# Patient Record
Sex: MALE | Race: WHITE | NOT HISPANIC OR LATINO | Employment: FULL TIME | URBAN - METROPOLITAN AREA
[De-identification: names, ages, dates, MRNs, and addresses within clinical notes are randomized per-mention and may not be internally consistent; named-entity substitution may affect disease eponyms.]

---

## 2017-04-12 ENCOUNTER — APPOINTMENT (OUTPATIENT)
Dept: CT IMAGING | Facility: HOSPITAL | Age: 42
End: 2017-04-12

## 2017-04-12 ENCOUNTER — HOSPITAL ENCOUNTER (INPATIENT)
Facility: HOSPITAL | Age: 42
LOS: 2 days | Discharge: HOME | End: 2017-04-14
Attending: INTERNAL MEDICINE | Admitting: INTERNAL MEDICINE

## 2017-04-12 PROBLEM — J84.01 PULMONARY ALVEOLAR PROTEINOSIS (HCC): Status: ACTIVE | Noted: 2017-04-12

## 2017-04-12 LAB
ANION GAP SERPL CALCULATED.3IONS-SCNC: 10.9 MMOL/L
BASOPHILS # BLD AUTO: 0.03 10*3/MM3 (ref 0–0.2)
BASOPHILS NFR BLD AUTO: 0.2 % (ref 0–1.5)
BUN BLD-MCNC: 6 MG/DL (ref 6–20)
BUN/CREAT SERPL: 5.5 (ref 7–25)
CALCIUM SPEC-SCNC: 9 MG/DL (ref 8.6–10.5)
CHLORIDE SERPL-SCNC: 102 MMOL/L (ref 98–107)
CK SERPL-CCNC: 128 U/L (ref 20–200)
CO2 SERPL-SCNC: 28.1 MMOL/L (ref 22–29)
CREAT BLD-MCNC: 1.1 MG/DL (ref 0.76–1.27)
DEPRECATED RDW RBC AUTO: 43.6 FL (ref 37–54)
EOSINOPHIL # BLD AUTO: 0.12 10*3/MM3 (ref 0–0.7)
EOSINOPHIL NFR BLD AUTO: 0.6 % (ref 0.3–6.2)
ERYTHROCYTE [DISTWIDTH] IN BLOOD BY AUTOMATED COUNT: 13.7 % (ref 11.5–14.5)
GFR SERPL CREATININE-BSD FRML MDRD: 74 ML/MIN/1.73
GLUCOSE BLD-MCNC: 93 MG/DL (ref 65–99)
HCT VFR BLD AUTO: 38.3 % (ref 40.4–52.2)
HGB BLD-MCNC: 12.7 G/DL (ref 13.7–17.6)
IMM GRANULOCYTES # BLD: 0.05 10*3/MM3 (ref 0–0.03)
IMM GRANULOCYTES NFR BLD: 0.3 % (ref 0–0.5)
LYMPHOCYTES # BLD AUTO: 3.84 10*3/MM3 (ref 0.9–4.8)
LYMPHOCYTES NFR BLD AUTO: 19.6 % (ref 19.6–45.3)
MCH RBC QN AUTO: 28.9 PG (ref 27–32.7)
MCHC RBC AUTO-ENTMCNC: 33.2 G/DL (ref 32.6–36.4)
MCV RBC AUTO: 87 FL (ref 79.8–96.2)
MONOCYTES # BLD AUTO: 1.79 10*3/MM3 (ref 0.2–1.2)
MONOCYTES NFR BLD AUTO: 9.1 % (ref 5–12)
NEUTROPHILS # BLD AUTO: 13.79 10*3/MM3 (ref 1.9–8.1)
NEUTROPHILS NFR BLD AUTO: 70.2 % (ref 42.7–76)
NT-PROBNP SERPL-MCNC: 394.2 PG/ML (ref 5–450)
PLATELET # BLD AUTO: 298 10*3/MM3 (ref 140–500)
PMV BLD AUTO: 9.8 FL (ref 6–12)
POTASSIUM BLD-SCNC: 4.3 MMOL/L (ref 3.5–5.2)
PROCALCITONIN SERPL-MCNC: 0.16 NG/ML (ref 0.1–0.25)
RBC # BLD AUTO: 4.4 10*6/MM3 (ref 4.6–6)
SODIUM BLD-SCNC: 141 MMOL/L (ref 136–145)
TROPONIN T SERPL-MCNC: <0.01 NG/ML (ref 0–0.03)
WBC NRBC COR # BLD: 19.62 10*3/MM3 (ref 4.5–10.7)

## 2017-04-12 PROCEDURE — 25010000002 ENOXAPARIN PER 10 MG: Performed by: INTERNAL MEDICINE

## 2017-04-12 PROCEDURE — 84484 ASSAY OF TROPONIN QUANT: CPT | Performed by: INTERNAL MEDICINE

## 2017-04-12 PROCEDURE — G0378 HOSPITAL OBSERVATION PER HR: HCPCS

## 2017-04-12 PROCEDURE — 82550 ASSAY OF CK (CPK): CPT | Performed by: INTERNAL MEDICINE

## 2017-04-12 PROCEDURE — 25010000003 CEFTRIAXONE PER 250 MG: Performed by: INTERNAL MEDICINE

## 2017-04-12 PROCEDURE — 93010 ELECTROCARDIOGRAM REPORT: CPT | Performed by: INTERNAL MEDICINE

## 2017-04-12 PROCEDURE — 85025 COMPLETE CBC W/AUTO DIFF WBC: CPT | Performed by: INTERNAL MEDICINE

## 2017-04-12 PROCEDURE — 25010000002 HYDROMORPHONE PER 4 MG: Performed by: INTERNAL MEDICINE

## 2017-04-12 PROCEDURE — 80048 BASIC METABOLIC PNL TOTAL CA: CPT | Performed by: INTERNAL MEDICINE

## 2017-04-12 PROCEDURE — 84145 PROCALCITONIN (PCT): CPT | Performed by: INTERNAL MEDICINE

## 2017-04-12 PROCEDURE — 93005 ELECTROCARDIOGRAM TRACING: CPT | Performed by: INTERNAL MEDICINE

## 2017-04-12 PROCEDURE — 83880 ASSAY OF NATRIURETIC PEPTIDE: CPT | Performed by: INTERNAL MEDICINE

## 2017-04-12 PROCEDURE — 71260 CT THORAX DX C+: CPT

## 2017-04-12 PROCEDURE — 0 IOPAMIDOL 61 % SOLUTION: Performed by: INTERNAL MEDICINE

## 2017-04-12 RX ORDER — HYDROCODONE BITARTRATE AND ACETAMINOPHEN 5; 325 MG/1; MG/1
1 TABLET ORAL EVERY 4 HOURS PRN
Status: DISCONTINUED | OUTPATIENT
Start: 2017-04-12 | End: 2017-04-14 | Stop reason: HOSPADM

## 2017-04-12 RX ORDER — ONDANSETRON 4 MG/1
4 TABLET, FILM COATED ORAL EVERY 6 HOURS PRN
Status: DISCONTINUED | OUTPATIENT
Start: 2017-04-12 | End: 2017-04-14 | Stop reason: HOSPADM

## 2017-04-12 RX ORDER — SODIUM CHLORIDE 0.9 % (FLUSH) 0.9 %
1-10 SYRINGE (ML) INJECTION AS NEEDED
Status: DISCONTINUED | OUTPATIENT
Start: 2017-04-12 | End: 2017-04-14 | Stop reason: HOSPADM

## 2017-04-12 RX ORDER — ONDANSETRON 2 MG/ML
4 INJECTION INTRAMUSCULAR; INTRAVENOUS EVERY 6 HOURS PRN
Status: DISCONTINUED | OUTPATIENT
Start: 2017-04-12 | End: 2017-04-14 | Stop reason: HOSPADM

## 2017-04-12 RX ORDER — ONDANSETRON 4 MG/1
4 TABLET, ORALLY DISINTEGRATING ORAL EVERY 6 HOURS PRN
Status: DISCONTINUED | OUTPATIENT
Start: 2017-04-12 | End: 2017-04-14 | Stop reason: HOSPADM

## 2017-04-12 RX ORDER — BISACODYL 5 MG/1
5 TABLET, DELAYED RELEASE ORAL DAILY PRN
Status: DISCONTINUED | OUTPATIENT
Start: 2017-04-12 | End: 2017-04-14 | Stop reason: HOSPADM

## 2017-04-12 RX ORDER — HYDROMORPHONE HYDROCHLORIDE 1 MG/ML
0.5 INJECTION, SOLUTION INTRAMUSCULAR; INTRAVENOUS; SUBCUTANEOUS
Status: DISCONTINUED | OUTPATIENT
Start: 2017-04-12 | End: 2017-04-14 | Stop reason: HOSPADM

## 2017-04-12 RX ADMIN — ENOXAPARIN SODIUM 40 MG: 40 INJECTION SUBCUTANEOUS at 21:36

## 2017-04-12 RX ADMIN — HYDROMORPHONE HYDROCHLORIDE 0.5 MG: 1 INJECTION, SOLUTION INTRAMUSCULAR; INTRAVENOUS; SUBCUTANEOUS at 21:36

## 2017-04-12 RX ADMIN — HYDROCODONE BITARTRATE AND ACETAMINOPHEN 1 TABLET: 5; 325 TABLET ORAL at 16:40

## 2017-04-12 RX ADMIN — HYDROMORPHONE HYDROCHLORIDE 0.5 MG: 1 INJECTION, SOLUTION INTRAMUSCULAR; INTRAVENOUS; SUBCUTANEOUS at 17:20

## 2017-04-12 RX ADMIN — HYDROMORPHONE HYDROCHLORIDE 0.5 MG: 1 INJECTION, SOLUTION INTRAMUSCULAR; INTRAVENOUS; SUBCUTANEOUS at 19:35

## 2017-04-12 RX ADMIN — CEFTRIAXONE SODIUM 1 G: 1 INJECTION, SOLUTION INTRAVENOUS at 18:26

## 2017-04-12 RX ADMIN — IOPAMIDOL 75 ML: 612 INJECTION, SOLUTION INTRAVENOUS at 18:45

## 2017-04-12 NOTE — H&P
History and Physical    Patient Name: Arturo Canales  Age/Sex: 41 y.o. male  : 1975  MRN: [id number]    Date of Admission: 2017  Date of Encounter Visit: 17  Encounter Provider: Chriss Mason MD  Referring Provider: Chriss Mason MD  Place of Service: [facility]   Patient Care Team:  Kiah Gonzales MD as PCP - General (Internal Medicine)      Subjective:     Chief Complaint: Chest pain of the pleuritic type    History of Present Illness:  Arturo Canales is a 41 y.o. 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  Patient had complications that time with severe pleurisy from pneumonia he did grow some Streptococcus organism and he developed diaphragmatic paralysis and was unable to handle his secretion and was intubated and on the ventilator for several days.  Eventually he gradually improved and was discharged home with follow-up chest x-ray and sniff test showing significant improvement and normalization of the left hemidiaphragmatic function.  His lung biopsy and BAL and all the studies were consistent with pulmonary RV over proteinosis and he did not require any alveolar lavage at the time.  Patient continue to follow with the main regional expert in this disease which is Dr. Mariee in [place], he saw far had 2 alveolar lavage is done for the pulmonary alveolar proteinosis treatment the last one was done yesterday.  After the first lavage patient was more dyspneic and it took him a while to recover this time he did actually much better and he was feeling almost better than his baseline within few hours after the procedure and was done as an outpatient.  Today he's been having some pleurisy on the left side and there is no productive cough or hemoptysis or fever or chills.  Patient went to the emergency room at [facility] and I was called by the patient per his request to arrange for the transfer for further  evaluation and treatment.  He did have a chest x-ray over there that based on the conversation with the ER physician at the other hospital, it showed a wedge infiltrate and there was some concern about pulmonary embolism given the pleurisy.  Patient had not had any blood test done and he is oxygenating well on room air is still.  The plan was to transfer to [facility] for 23 hour observation and to evaluated with a CT of the chest PE protocol and blood workup and antibiotic with further decision-making depending on the results.      Pulmonary Functions Testing Results:    Review of Systems:   Review of Systems  Pleuritic chest pain with dyspnea and exertion, no nausea or vomiting or diarrhea or abdominal pain or discomfort  No dysuria or hematuria problem with urinary frequency or urgency  No lower extremity edema or swelling or joint swelling or effusion  No confusion or altered mental status,  No chills, no productive cough or hemoptysis.  Rest of the 12 point system review was negative  Past Medical History:  Past Medical History:   Diagnosis Date   • Pneumonia        Past Surgical History:   Procedure Laterality Date   • BACK SURGERY      TWICE   • BRONCHOSCOPY N/A 10/27/2016    Procedure: BRONCHOSCOPY WITH FLUORO & CRYO BIOPSY;  Surgeon: Italo Escalera MD;  Location: [facility] ENDOSCOPY;  Service:    • BRONCHOSCOPY N/A 10/31/2016    Procedure: BRONCHOSCOPY BIOPSY AT BEDSIDE;  Surgeon: Chriss Mason MD;  Location: [facility] ENDOSCOPY;  Service:    • BRONCHOSCOPY N/A 11/4/2016    Procedure: BRONCHOSCOPY  WITH WASHINGS;  Surgeon: Chriss Mason MD;  Location: [facility] ENDOSCOPY;  Service:    • KNEE ARTHROSCOPY Bilateral    • WRIST FRACTURE SURGERY Left        Home Medications:   Prescriptions Prior to Admission   Medication Sig Dispense Refill Last Dose   • predniSONE (DELTASONE) 20 MG tablet One pill bid for 1 day, then  1 daily for 2 days, then  1/2 daily for 4 days, then stop 6 tablet 0    • temazepam  (RESTORIL) 15 MG capsule Take 1 capsule by mouth At Night As Needed for sleep. 20 capsule 0    • traZODone (DESYREL) 50 MG tablet Take 50 mg by mouth Every Night.   10/27/2016 at Unknown time   • vilazodone (VIIBRYD) 40 MG tablet tablet Take 40 mg by mouth Daily.   10/27/2016 at Unknown time       Inpatient Medications:  Scheduled Meds:  enoxaparin 40 mg Subcutaneous Q12H     Continuous Infusions:   PRN Meds:.•  bisacodyl  •  HYDROcodone-acetaminophen  •  ondansetron **OR** ondansetron ODT **OR** ondansetron  •  sodium chloride    Allergies:  No Known Allergies    Past Social History:  Social History     Social History   • Marital status:      Spouse name: N/A   • Number of children: N/A   • Years of education: N/A     Social History Main Topics   • Smoking status: Never Smoker   • Smokeless tobacco: Not on file   • Alcohol use 0.6 oz/week     1 Glasses of wine per week      Comment: once or twice a week   • Drug use: No   • Sexual activity: Not on file     Other Topics Concern   • Not on file     Social History Narrative       Past Family History:  Family History   Problem Relation Age of Onset   • Lymphoma Father            Objective:   Temp:  [98 °F (36.7 °C)] 98 °F (36.7 °C)  Heart Rate:  [53] 53  Resp:  [18] 18  BP: (99)/(58) 99/58   SpO2:  [98 %] 98 %  on    O2 Device: room air  No intake or output data in the 24 hours ending 04/12/17 1512  There is no height or weight on file to calculate BMI.  There were no vitals filed for this visit.  Weight change:     Physical Exam:   Physical Exam   General:    No acute distress, alert and oriented x4, pleasant on room air                    Head:    Normocephalic, atraumatic.   Eyes:          Conjunctivae and sclerae normal, no icterus, PERRLA   Throat:   No oral lesions, no thrush, oral mucosa moist.    Neck:   Supple, trachea midline.   Lungs:     Normal chest on inspection, CTAB, no wheezes. No rhonchi.  Minimal inspiratory crackles. Respirations regular,  even and unlabored.     Heart:    Regular rhythm and normal rate.  No murmurs, gallops, or rubs noted.   Abdomen:     Soft, non-tender, non-distended, positive bowel sounds.    Extremities:   No clubbing, cyanosis, or edema.     Pulses:   Pulses palpable and equal bilaterally.    Skin:   No bleeding or rash.   Neuro:   Non-focal.  Moves all extremities well.    Psychiatric:   Normal mood and affect.     Lab Review:                                                                   CXR 12/16/2016    Imaging:  Imaging Results (most recent)     None          I personally viewed and interpreted the patient's imaging studies.    Assessment:     Chest pain  PAP  MARTINEZ      Plan:   Patient had a recent procedure with general anesthesia for 3 hours and this is always a risk for venous thromboembolism.  He is complaining of chest pain and his chest x-ray showed a wedge shaped infiltrate and he has valid reasons to have similar finding given his underlying disease however pulmonary embolism still needs to be ruled out.  Patient has similar presentation several months ago when he was first diagnosed with a pulmonary alveolar proteinosis and he had a pleurisy and pneumonia at the time was significant pleurisy requiring IV PCA Dilaudid and despite that he had significant splinting and  paralysis of that left hemidiaphragm which did improve on follow-up.  Patient just had lavage of his right lung for his PAP on April 11, 2017 but he did not have any immediate postoperative problem other than this morning when he started having the chest pain and his recovery was even faster than his prior lung lavage that he had at the [facility] for pulmonary alveolar proteinosis with Dr. Mariee/claudia.  We will proceed with a CT PE protocol will check stat labs including pro-calcitonin, we'll cover for pneumonia which sometimes can be a complication from bronchoscopy and transient bacteremia and will consider further recommendation and  will consult with his PAP specialist after the CT scan results are available for review  Patient would be 23 hour observation and will reassess in the morning and decide if full admission need to be considered  ADDENDUM:  I did review the case with dr Raza after the Ct films were available and she could not remember his previous scan and she will get back to me in the morning after she review them. His WBC was elevated but his PCT was 0.15,  updated the family and will reassess in am, he was started on rocephin for now.          Chriss Mason MD  [facility]   04/12/17  3:12 PM    EMR Dragon/Transcription disclaimer:   Much of this encounter note is an electronic transcription/translation of spoken language to printed text. The electronic translation of spoken language may permit erroneous, or at times, nonsensical words or phrases to be inadvertently transcribed; Although I have reviewed the note for such errors, some may still exist.

## 2017-04-12 NOTE — PLAN OF CARE
Problem: Patient Care Overview (Adult)  Goal: Plan of Care Review  Outcome: Ongoing (interventions implemented as appropriate)    04/12/17 1600 04/12/17 9569   Outcome Evaluation   Outcome Summary/Follow up Plan --  CT Chest pending. VSS. Pain treated with dilaudid. IV rocephine. Continue to monitor.   Coping/Psychosocial Response Interventions   Plan Of Care Reviewed With patient;spouse --        Goal: Adult Individualization and Mutuality  Outcome: Ongoing (interventions implemented as appropriate)  Goal: Discharge Needs Assessment  Outcome: Ongoing (interventions implemented as appropriate)    Problem: Pain, Acute (Adult)  Goal: Identify Related Risk Factors and Signs and Symptoms  Outcome: Outcome(s) achieved Date Met:  04/12/17  Goal: Acceptable Pain Control/Comfort Level  Outcome: Ongoing (interventions implemented as appropriate)

## 2017-04-13 PROBLEM — J18.9 PNA (PNEUMONIA): Status: ACTIVE | Noted: 2017-04-13

## 2017-04-13 LAB
DEPRECATED RDW RBC AUTO: 44.3 FL (ref 37–54)
ERYTHROCYTE [DISTWIDTH] IN BLOOD BY AUTOMATED COUNT: 13.7 % (ref 11.5–14.5)
HCT VFR BLD AUTO: 38.4 % (ref 40.4–52.2)
HGB BLD-MCNC: 12.7 G/DL (ref 13.7–17.6)
IGA1 MFR SER: 143 MG/DL (ref 70–400)
IGG1 SER-MCNC: 703 MG/DL (ref 700–1600)
IGM SERPL-MCNC: 110 MG/DL (ref 40–230)
MCH RBC QN AUTO: 29.1 PG (ref 27–32.7)
MCHC RBC AUTO-ENTMCNC: 33.1 G/DL (ref 32.6–36.4)
MCV RBC AUTO: 88.1 FL (ref 79.8–96.2)
PLATELET # BLD AUTO: 305 10*3/MM3 (ref 140–500)
PMV BLD AUTO: 9.9 FL (ref 6–12)
RBC # BLD AUTO: 4.36 10*6/MM3 (ref 4.6–6)
S PYO AG THROAT QL: NEGATIVE
WBC NRBC COR # BLD: 13.67 10*3/MM3 (ref 4.5–10.7)

## 2017-04-13 PROCEDURE — 25010000002 HYDROMORPHONE PER 4 MG: Performed by: INTERNAL MEDICINE

## 2017-04-13 PROCEDURE — 99255 IP/OBS CONSLTJ NEW/EST HI 80: CPT | Performed by: INTERNAL MEDICINE

## 2017-04-13 PROCEDURE — 25010000003 CEFTRIAXONE PER 250 MG: Performed by: INTERNAL MEDICINE

## 2017-04-13 PROCEDURE — 85027 COMPLETE CBC AUTOMATED: CPT | Performed by: INTERNAL MEDICINE

## 2017-04-13 PROCEDURE — 87205 SMEAR GRAM STAIN: CPT | Performed by: INTERNAL MEDICINE

## 2017-04-13 PROCEDURE — 25010000002 ENOXAPARIN PER 10 MG: Performed by: INTERNAL MEDICINE

## 2017-04-13 PROCEDURE — 87070 CULTURE OTHR SPECIMN AEROBIC: CPT | Performed by: INTERNAL MEDICINE

## 2017-04-13 PROCEDURE — 82787 IGG 1 2 3 OR 4 EACH: CPT | Performed by: INTERNAL MEDICINE

## 2017-04-13 PROCEDURE — 87880 STREP A ASSAY W/OPTIC: CPT | Performed by: INTERNAL MEDICINE

## 2017-04-13 RX ORDER — TRAZODONE HYDROCHLORIDE 50 MG/1
150 TABLET ORAL NIGHTLY
COMMUNITY
Start: 2017-01-24 | End: 2017-04-14 | Stop reason: HOSPADM

## 2017-04-13 RX ORDER — TRAZODONE HYDROCHLORIDE 150 MG/1
150 TABLET ORAL NIGHTLY
Status: DISCONTINUED | OUTPATIENT
Start: 2017-04-13 | End: 2017-04-14 | Stop reason: HOSPADM

## 2017-04-13 RX ORDER — VILAZODONE HYDROCHLORIDE 40 MG/1
40 TABLET ORAL DAILY
Status: DISCONTINUED | OUTPATIENT
Start: 2017-04-13 | End: 2017-04-14 | Stop reason: HOSPADM

## 2017-04-13 RX ORDER — VILAZODONE HYDROCHLORIDE 40 MG/1
40 TABLET ORAL NIGHTLY
COMMUNITY
Start: 2017-01-12 | End: 2017-04-14 | Stop reason: HOSPADM

## 2017-04-13 RX ORDER — CEFTRIAXONE SODIUM 2 G/50ML
2 INJECTION, SOLUTION INTRAVENOUS EVERY 24 HOURS
Status: COMPLETED | OUTPATIENT
Start: 2017-04-13 | End: 2017-04-14

## 2017-04-13 RX ADMIN — HYDROMORPHONE HYDROCHLORIDE 0.5 MG: 1 INJECTION, SOLUTION INTRAMUSCULAR; INTRAVENOUS; SUBCUTANEOUS at 19:16

## 2017-04-13 RX ADMIN — HYDROMORPHONE HYDROCHLORIDE 0.5 MG: 1 INJECTION, SOLUTION INTRAMUSCULAR; INTRAVENOUS; SUBCUTANEOUS at 21:27

## 2017-04-13 RX ADMIN — HYDROCODONE BITARTRATE AND ACETAMINOPHEN 1 TABLET: 5; 325 TABLET ORAL at 14:20

## 2017-04-13 RX ADMIN — ENOXAPARIN SODIUM 40 MG: 40 INJECTION SUBCUTANEOUS at 21:01

## 2017-04-13 RX ADMIN — HYDROCODONE BITARTRATE AND ACETAMINOPHEN 1 TABLET: 5; 325 TABLET ORAL at 22:50

## 2017-04-13 RX ADMIN — TRAZODONE HYDROCHLORIDE 150 MG: 150 TABLET, FILM COATED ORAL at 21:01

## 2017-04-13 RX ADMIN — ENOXAPARIN SODIUM 40 MG: 40 INJECTION SUBCUTANEOUS at 09:52

## 2017-04-13 RX ADMIN — VILAZODONE HYDROCHLORIDE 40 MG: 40 TABLET ORAL at 21:01

## 2017-04-13 RX ADMIN — HYDROCODONE BITARTRATE AND ACETAMINOPHEN 1 TABLET: 5; 325 TABLET ORAL at 09:52

## 2017-04-13 RX ADMIN — HYDROMORPHONE HYDROCHLORIDE 0.5 MG: 1 INJECTION, SOLUTION INTRAMUSCULAR; INTRAVENOUS; SUBCUTANEOUS at 16:20

## 2017-04-13 RX ADMIN — CEFTRIAXONE SODIUM 2 G: 2 INJECTION, SOLUTION INTRAVENOUS at 12:20

## 2017-04-13 RX ADMIN — HYDROMORPHONE HYDROCHLORIDE 0.5 MG: 1 INJECTION, SOLUTION INTRAMUSCULAR; INTRAVENOUS; SUBCUTANEOUS at 11:46

## 2017-04-13 NOTE — PAYOR COMM NOTE
"Faizan West (41 y.o. Male)     PLEASE SEE ATTACHED CLINICAL REVIEW ON PATIENT. PT. WAS ADMITTED TO [facility] ON 17--      REF#[id number]    PLEASE CALL   OR FAAX  WITH INPT AUTH AND DAYS APPROVED.     THANK YOU    ELMER BOWLING LPN, CCP        Date of Birth Social Security Number Address Home Phone MRN    1975  [id number] [phone] [id number]    Syn[employer] Marital Status          Yarsanism        Admission Date Admission Type Admitting Provider Attending Provider Department, Room/Bed    17 Elective Chriss Mason MD Saad, Lebnan S, MD [address], 3/1    Discharge Date Discharge Disposition Discharge Destination                      Attending Provider: Chriss Mason MD     Allergies:  No Known Allergies    Isolation:  None   Infection:  None   Code Status:  FULL    Ht:  71\" (180.3 cm)   Wt:  175 lb 4.8 oz (79.5 kg)    Admission Cmt:  None   Principal Problem:  None                Active Insurance as of 2017     Primary Coverage     Payor Plan Insurance Group Employer/Plan Group    [facility] iogyn [facility] [id number]     Payor Plan Address Payor Plan Phone Number Effective From Effective To    [address] [address] [phone] 2016     [place] [address]       Subscriber Name Subscriber Birth Date Member ID       FAIZAN WEST 1975 [id number]                 Emergency Contacts      (Rel.) Home Phone Work Phone Mobile Phone    Serenity West (Spouse) [phone] -- [phone]               History & Physical      Chriss Mason MD at 2017  3:12 PM            History and Physical    Patient Name: Faizan West  Age/Sex: 41 y.o. male  : 1975  MRN: [id number]    Date of Admission: 2017  Date of Encounter Visit: 17  Encounter Provider: Chriss Mason MD  Referring Provider: Chriss Mason MD  Place of Service: [facility]   Patient Care Team:  Kiah" ELISE Gonzales MD as PCP - General (Internal Medicine)      Subjective:     Chief Complaint: Chest pain of the pleuritic type    History of Present Illness:  Arturo Canales is a 41 y.o. 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  Patient had complications that time with severe pleurisy from pneumonia he did grow some Streptococcus organism and he developed diaphragmatic paralysis and was unable to handle his secretion and was intubated and on the ventilator for several days.  Eventually he gradually improved and was discharged home with follow-up chest x-ray and sniff test showing significant improvement and normalization of the left hemidiaphragmatic function.  His lung biopsy and BAL and all the studies were consistent with pulmonary RV over proteinosis and he did not require any alveolar lavage at the time.  Patient continue to follow with the main regional expert in this disease which is Dr. Mariee in [place], he saw far had 2 alveolar lavage is done for the pulmonary alveolar proteinosis treatment the last one was done yesterday.  After the first lavage patient was more dyspneic and it took him a while to recover this time he did actually much better and he was feeling almost better than his baseline within few hours after the procedure and was done as an outpatient.  Today he's been having some pleurisy on the left side and there is no productive cough or hemoptysis or fever or chills.  Patient went to the emergency room at [facility] and I was called by the patient per his request to arrange for the transfer for further evaluation and treatment.  He did have a chest x-ray over there that based on the conversation with the ER physician at the other hospital, it showed a wedge infiltrate and there was some concern about pulmonary embolism given the pleurisy.  Patient had not had any blood test done and he is oxygenating well on room air  is still.  The plan was to transfer to [facility] for 23 hour observation and to evaluated with a CT of the chest PE protocol and blood workup and antibiotic with further decision-making depending on the results.      Pulmonary Functions Testing Results:    Review of Systems:   Review of Systems  Pleuritic chest pain with dyspnea and exertion, no nausea or vomiting or diarrhea or abdominal pain or discomfort  No dysuria or hematuria problem with urinary frequency or urgency  No lower extremity edema or swelling or joint swelling or effusion  No confusion or altered mental status,  No chills, no productive cough or hemoptysis.  Rest of the 12 point system review was negative  Past Medical History:  Past Medical History:   Diagnosis Date   • Pneumonia        Past Surgical History:   Procedure Laterality Date   • BACK SURGERY      TWICE   • BRONCHOSCOPY N/A 10/27/2016    Procedure: BRONCHOSCOPY WITH FLUORO & CRYO BIOPSY;  Surgeon: Italo Escalera MD;  Location: [facility] ENDOSCOPY;  Service:    • BRONCHOSCOPY N/A 10/31/2016    Procedure: BRONCHOSCOPY BIOPSY AT BEDSIDE;  Surgeon: Chriss Mason MD;  Location: [facility] ENDOSCOPY;  Service:    • BRONCHOSCOPY N/A 11/4/2016    Procedure: BRONCHOSCOPY  WITH WASHINGS;  Surgeon: Chriss Mason MD;  Location: [facility] ENDOSCOPY;  Service:    • KNEE ARTHROSCOPY Bilateral    • WRIST FRACTURE SURGERY Left        Home Medications:   Prescriptions Prior to Admission   Medication Sig Dispense Refill Last Dose   • predniSONE (DELTASONE) 20 MG tablet One pill bid for 1 day, then  1 daily for 2 days, then  1/2 daily for 4 days, then stop 6 tablet 0    • temazepam (RESTORIL) 15 MG capsule Take 1 capsule by mouth At Night As Needed for sleep. 20 capsule 0    • traZODone (DESYREL) 50 MG tablet Take 50 mg by mouth Every Night.   10/27/2016 at Unknown time   • vilazodone (VIIBRYD) 40 MG tablet tablet Take 40 mg by mouth Daily.   10/27/2016 at Unknown time       Inpatient  Medications:  Scheduled Meds:  enoxaparin 40 mg Subcutaneous Q12H     Continuous Infusions:   PRN Meds:.•  bisacodyl  •  HYDROcodone-acetaminophen  •  ondansetron **OR** ondansetron ODT **OR** ondansetron  •  sodium chloride    Allergies:  No Known Allergies    Past Social History:  Social History     Social History   • Marital status:      Spouse name: N/A   • Number of children: N/A   • Years of education: N/A     Social History Main Topics   • Smoking status: Never Smoker   • Smokeless tobacco: Not on file   • Alcohol use 0.6 oz/week     1 Glasses of wine per week      Comment: once or twice a week   • Drug use: No   • Sexual activity: Not on file     Other Topics Concern   • Not on file     Social History Narrative       Past Family History:  Family History   Problem Relation Age of Onset   • Lymphoma Father            Objective:   Temp:  [98 °F (36.7 °C)] 98 °F (36.7 °C)  Heart Rate:  [53] 53  Resp:  [18] 18  BP: (99)/(58) 99/58   SpO2:  [98 %] 98 %  on    O2 Device: room air  No intake or output data in the 24 hours ending 04/12/17 1512  There is no height or weight on file to calculate BMI.  There were no vitals filed for this visit.  Weight change:     Physical Exam:   Physical Exam   General:    No acute distress, alert and oriented x4, pleasant on room air                    Head:    Normocephalic, atraumatic.   Eyes:          Conjunctivae and sclerae normal, no icterus, PERRLA   Throat:   No oral lesions, no thrush, oral mucosa moist.    Neck:   Supple, trachea midline.   Lungs:     Normal chest on inspection, CTAB, no wheezes. No rhonchi.  Minimal inspiratory crackles. Respirations regular, even and unlabored.     Heart:    Regular rhythm and normal rate.  No murmurs, gallops, or rubs noted.   Abdomen:     Soft, non-tender, non-distended, positive bowel sounds.    Extremities:   No clubbing, cyanosis, or edema.     Pulses:   Pulses palpable and equal bilaterally.    Skin:   No bleeding or rash.    Neuro:   Non-focal.  Moves all extremities well.    Psychiatric:   Normal mood and affect.     Lab Review:                                                                   CXR 12/16/2016    Imaging:  Imaging Results (most recent)     None          I personally viewed and interpreted the patient's imaging studies.    Assessment:     Chest pain  PAP  MARTINEZ      Plan:   Patient had a recent procedure with general anesthesia for 3 hours and this is always a risk for venous thromboembolism.  He is complaining of chest pain and his chest x-ray showed a wedge shaped infiltrate and he has valid reasons to have similar finding given his underlying disease however pulmonary embolism still needs to be ruled out.  Patient has similar presentation several months ago when he was first diagnosed with a pulmonary alveolar proteinosis and he had a pleurisy and pneumonia at the time was significant pleurisy requiring IV PCA Dilaudid and despite that he had significant splinting and  paralysis of that left hemidiaphragm which did improve on follow-up.  Patient just had lavage of his right lung for his PAP on April 11, 2017 but he did not have any immediate postoperative problem other than this morning when he started having the chest pain and his recovery was even faster than his prior lung lavage that he had at the [facility] for pulmonary alveolar proteinosis with Dr. Mariee/claudia.  We will proceed with a CT PE protocol will check stat labs including pro-calcitonin, we'll cover for pneumonia which sometimes can be a complication from bronchoscopy and transient bacteremia and will consider further recommendation and will consult with his PAP specialist after the CT scan results are available for review  Patient would be 23 hour observation and will reassess in the morning and decide if full admission need to be considered  ADDENDUM:  I did review the case with dr Raza after the Ct films were available and she could not  remember his previous scan and she will get back to me in the morning after she review them. His WBC was elevated but his PCT was 0.15,  updated the family and will reassess in am, he was started on rocephin for now.          Chriss Mason MD  [facility]   04/12/17  3:12 PM    EMR Dragon/Transcription disclaimer:   Much of this encounter note is an electronic transcription/translation of spoken language to printed text. The electronic translation of spoken language may permit erroneous, or at times, nonsensical words or phrases to be inadvertently transcribed; Although I have reviewed the note for such errors, some may still exist.            Electronically signed by Chriss Mason MD at 4/12/2017  7:10 PM        Emergency Department Notes     No notes of this type exist for this encounter.        Vital Signs (last 24 hours)       04/12 0700  -  04/13 0659 04/13 0700  -  04/13 1422   Most Recent    Temp (°F) 97.1 -  98    97.9 -  98.1     97.9 (36.6)    Heart Rate 51 -  75    57 -  71     71    Resp   18    18 -  20     18    BP 91/56 -  103/64    106/69 -  108/66     106/69    SpO2 (%) 98 -  99    95 -  96     95          Intake & Output (last day)       04/12 0701 - 04/13 0700 04/13 0701 - 04/14 0700    IV Piggyback  50    Total Intake(mL/kg)  50 (0.6)    Net   +50              Lines, Drains & Airways    Active LDAs     Name:   Placement date:   Placement time:   Site:   Days:    Peripheral IV Line - Single Lumen 04/12/17 1618  20 gauge  04/12/17    1618      less than 1         Inactive LDAs     None                Hospital Medications (all)       Dose Frequency Start End    bisacodyl (DULCOLAX) EC tablet 5 mg 5 mg Daily PRN 4/12/2017     Sig - Route: Take 1 tablet by mouth Daily As Needed for Constipation. - Oral    cefTRIAXone (ROCEPHIN) IVPB 2 g 2 g Every 24 Hours 4/13/2017     Sig - Route: Infuse 50 mL into a venous catheter Daily. - Intravenous    enoxaparin (LOVENOX) syringe 40 mg 40 mg  "Every 12 Hours Scheduled 4/12/2017     Sig - Route: Inject 0.4 mL under the skin Every 12 (Twelve) Hours. - Subcutaneous    HYDROcodone-acetaminophen (NORCO) 5-325 MG per tablet 1 tablet 1 tablet Every 4 Hours PRN 4/12/2017 4/22/2017    Sig - Route: Take 1 tablet by mouth Every 4 (Four) Hours As Needed for Moderate Pain (4-6). - Oral    HYDROmorphone (DILAUDID) injection 0.5 mg 0.5 mg Every 2 Hours PRN 4/12/2017 4/22/2017    Sig - Route: Infuse 0.5 mg into a venous catheter Every 2 (Two) Hours As Needed for Severe Pain (7-10). - Intravenous    iopamidol (ISOVUE-300) 61 % injection 100 mL 100 mL Once in Imaging 4/12/2017 4/12/2017    Sig - Route: Infuse 100 mL into a venous catheter Once. - Intravenous    ondansetron (ZOFRAN) injection 4 mg 4 mg Every 6 Hours PRN 4/12/2017     Sig - Route: Infuse 2 mL into a venous catheter Every 6 (Six) Hours As Needed for Nausea or Vomiting. - Intravenous    Linked Group 1:  \"Or\" Linked Group Details        ondansetron (ZOFRAN) tablet 4 mg 4 mg Every 6 Hours PRN 4/12/2017     Sig - Route: Take 1 tablet by mouth Every 6 (Six) Hours As Needed for Nausea or Vomiting. - Oral    Linked Group 1:  \"Or\" Linked Group Details        ondansetron ODT (ZOFRAN-ODT) disintegrating tablet 4 mg 4 mg Every 6 Hours PRN 4/12/2017     Sig - Route: Take 1 tablet by mouth Every 6 (Six) Hours As Needed for Nausea or Vomiting. - Oral    Linked Group 1:  \"Or\" Linked Group Details        pneumococcal conj. 13-valent (PREVNAR-13) vaccine 0.5 mL 0.5 mL Once 4/13/2017     Sig - Route: Inject 0.5 mL into the shoulder, thigh, or buttocks 1 (One) Time. - Intramuscular    sodium chloride 0.9 % flush 1-10 mL 1-10 mL As Needed 4/12/2017     Sig - Route: Infuse 1-10 mL into a venous catheter As Needed for Line Care. - Intravenous    traZODone (DESYREL) tablet 150 mg 150 mg Nightly 4/13/2017     Sig - Route: Take 1 tablet by mouth Every Night. - Oral    vilazodone (VIIBRYD) tablet 40 mg 40 mg Daily 4/13/2017     Sig - " Route: Take 1 tablet by mouth Daily. - Oral    cefTRIAXone (ROCEPHIN) IVPB 1 g (Discontinued) 1 g Every 24 Hours 4/12/2017 4/13/2017    Sig - Route: Infuse 50 mL into a venous catheter Daily. - Intravenous            Lab Results (last 24 hours)     Procedure Component Value Units Date/Time    CBC Auto Differential [[id number]]  (Abnormal) Collected:  04/12/17 1606    Specimen:  Blood Updated:  04/12/17 1705     WBC 19.62 (H) 10*3/mm3      RBC 4.40 (L) 10*6/mm3      Hemoglobin 12.7 (L) g/dL      Hematocrit 38.3 (L) %      MCV 87.0 fL      MCH 28.9 pg      MCHC 33.2 g/dL      RDW 13.7 %      RDW-SD 43.6 fl      MPV 9.8 fL      Platelets 298 10*3/mm3      Neutrophil % 70.2 %      Lymphocyte % 19.6 %      Monocyte % 9.1 %      Eosinophil % 0.6 %      Basophil % 0.2 %      Immature Grans % 0.3 %      Neutrophils, Absolute 13.79 (H) 10*3/mm3      Lymphocytes, Absolute 3.84 10*3/mm3      Monocytes, Absolute 1.79 (H) 10*3/mm3      Eosinophils, Absolute 0.12 10*3/mm3      Basophils, Absolute 0.03 10*3/mm3      Immature Grans, Absolute 0.05 (H) 10*3/mm3     BNP [[id number]]  (Normal) Collected:  04/12/17 1606    Specimen:  Blood Updated:  04/12/17 1711     proBNP 394.2 pg/mL     Narrative:       Among patients with dyspnea, NT-proBNP is highly sensitive for the detection of acute congestive heart failure. In addition NT-proBNP of <300 pg/ml effectively rules out acute congestive heart failure with 99% negative predictive value.    Troponin [[id number]]  (Normal) Collected:  04/12/17 1606    Specimen:  Blood Updated:  04/12/17 1711     Troponin T <0.010 ng/mL     Narrative:       Troponin T Reference Ranges:  Less than 0.03 ng/mL:    Negative for AMI  0.03 to 0.09 ng/mL:      Indeterminant for AMI  Greater than 0.09 ng/mL: Positive for AMI    Basic Metabolic Panel [[id number]]  (Abnormal) Collected:  04/12/17 1606    Specimen:  Blood Updated:  04/12/17 1712     Glucose 93 mg/dL      BUN 6 mg/dL      Creatinine 1.10 mg/dL      Sodium  "141 mmol/L      Potassium 4.3 mmol/L      Chloride 102 mmol/L      CO2 28.1 mmol/L      Calcium 9.0 mg/dL      eGFR Non African Amer 74 mL/min/1.73      BUN/Creatinine Ratio 5.5 (L)     Anion Gap 10.9 mmol/L     Narrative:       GFR Normal >60  Chronic Kidney Disease <60  Kidney Failure <15    CK [[id number]]  (Normal) Collected:  04/12/17 1606    Specimen:  Blood Updated:  04/12/17 1712     Creatine Kinase 128 U/L     Procalcitonin [[id number]]  (Normal) Collected:  04/12/17 1606    Specimen:  Blood Updated:  04/12/17 1720     Procalcitonin 0.16 ng/mL     Narrative:       As a Marker for Sepsis (Non-Neonates):   1. <0.5 ng/mL represents a low risk of severe sepsis and/or septic shock.  1. >2 ng/mL represents a high risk of severe sepsis and/or septic shock.    As a Marker for Lower Respiratory Tract Infections that require antibiotic therapy:  PCT on Admission     Antibiotic Therapy             6-12 Hrs later  > 0.5                Strongly Recommended            >0.25 - <0.5         Recommended  0.1 - 0.25           Discouraged                   Remeasure/reassess PCT  <0.1                 Strongly Discouraged          Remeasure/reassess PCT      As 28 day mortality risk marker: \"Change in Procalcitonin Result\" (> 80 % or <=80 %) if Day 0 (or Day 1) and Day 4 values are available. Refer to http://www.[employer]-pct-calculator.com/   Change in PCT <=80 %   A decrease of PCT levels below or equal to 80 % defines a positive change in PCT test result representing a higher risk for 28-day all-cause mortality of patients diagnosed with severe sepsis or septic shock.  Change in PCT > 80 %   A decrease of PCT levels of more than 80 % defines a negative change in PCT result representing a lower risk for 28-day all-cause mortality of patients diagnosed with severe sepsis or septic shock.                CBC (No Diff) [[id number]]  (Abnormal) Collected:  04/13/17 0519    Specimen:  Blood Updated:  04/13/17 0608     WBC 13.67 (H) " 10*3/mm3      RBC 4.36 (L) 10*6/mm3      Hemoglobin 12.7 (L) g/dL      Hematocrit 38.4 (L) %      MCV 88.1 fL      MCH 29.1 pg      MCHC 33.1 g/dL      RDW 13.7 %      RDW-SD 44.3 fl      MPV 9.9 fL      Platelets 305 10*3/mm3     IgG, IgA, IgM [[id number]] Collected:  04/13/17 1111    Specimen:  Blood Updated:  04/13/17 1117    IgG subclasses (1-4) [[id number]] Collected:  04/13/17 1111    Specimen:  Blood Updated:  04/13/17 1122        Imaging Results (last 24 hours)     Procedure Component Value Units Date/Time    CT Chest With Contrast [[id number]] Collected:  04/12/17 1842     Updated:  04/12/17 1856    Narrative:       CT CHEST W CONTRAST-     INDICATIONS: Pulmonary infiltrate     TECHNIQUE:     ENHANCED CHEST CT     COMPARISON: 11/06/2016     FINDINGS:              The heart size is normal without pericardial effusion.     A pretracheal lymph node measuring 7 mm in short axis on image 41 of  series 2, previously 9 mm in short axis. A right hilar lymph node  measuring 1.6 cm in short axis on image 52 of series 2, less well  visualized on prior exam but not definitely changed. Subcarinal lymph  node measuring 1.3 cm in short axis on image 55, not significantly  changed. These lymph nodes are nonspecific, could be reactive in nature,  potentially evidence of neoplasm, clinical correlation follow-up  suggested (if indicated, positron emission tomography could be  considered for further evaluation).           Central pulmonary arteries opacify normally. Minimal bilateral  gynecomastia is seen.     The airways appear clear.     No pleural effusion or pneumothorax.     The lungs show a consolidative and patchy infiltrate predominantly  involving the right upper lobe, for example image 34 series 5, also  involving the upper aspect of the right lower lobe, for example image 50  of series 5. Thickening of the right major fissure is noted at the upper  aspect. In the bilateral lower lobes, increased since of  groundglass  density, for example image 48 of series 3 may be inflammatory or  infectious in nature, could be evidence of edema. Minimal groundglass  density is apparent in the left upper lobe. Linear likely atelectasis or  scar is seen in the right lower lobe. Overall, appearance of the lungs  is significantly improved from the prior CT exam from 11/06/2016.                 Upper abdominal structures show mild enlargement of the spleen, 13.3 cm,  decreased from prior exam, previously 15.4 cm. Appearance of mild  gallbladder wall thickening, likely at least in part from lack of  distention, correlate clinically, further evaluation can be obtained as  may be clinically indicated. Stomach is distended with contents that can  be result of recent ingestion, or could be evidence of gastroparesis or  restriction of gastric outlet/gastric outlet obstruction; if indicated,  further evaluation could be obtained.           Degenerative changes are seen in the spine. No acute fracture is  identified.       Impression:             1. Patchy and consolidative infiltrative process predominantly involving  the right upper lung, with multifocal areas of groundglass density in  the rest of the lung. Right hilar, mediastinal adenopathy, nonspecific.   Continued follow-up/further evaluation recommended as indications  persist.  2. Mild thickening of the gallbladder wall may be related to lack of  distention, correlate clinically; if further evaluation is indicated,  consider ultrasound and/or hepatobiliary scintigraphy.     3. Decreased splenomegaly.     4. Stomach distended with contents.     This report was finalized on 4/12/2017 6:53 PM by Dr. Kermit Choi MD.           ECG/EMG Results (last 24 hours)     Procedure Component Value Units Date/Time    ECG 12 Lead [[id number]] Collected:  04/12/17 1534     Updated:  04/12/17 2118    Narrative:       RR Interval= 1111 ms  DC Interval= 148 ms  QRSD Interval= 88 ms  QT Interval= 416  ms  QTc Interval= 395 ms  Heart Rate= 54 ms  P Axis= 19 deg  QRS Axis= 63 deg  T Wave Axis= 41 deg  I: 40 Axis= 53 deg  T: 40 Axis= 73 deg  ST Axis= 48 deg  SINUS RHYTHM  NO SIGNIFICANT CHANGE FROM PREVIOUS ECG  Electronically Signed by:  Jose Guadalupe Miller ([facility]) 12-Apr-2017 21:13:41  Date and Time of Study: 2017-04-12 15:34:53          Orders (last 24 hrs)     Start     Ordered    04/13/17 2100  traZODone (DESYREL) tablet 150 mg  Nightly      04/13/17 1128    04/13/17 2100  vilazodone (VIIBRYD) tablet 40 mg  Daily      04/13/17 1128    04/13/17 1600  pneumococcal conj. 13-valent (PREVNAR-13) vaccine 0.5 mL  Once      04/13/17 1405    04/13/17 1300  cefTRIAXone (ROCEPHIN) IVPB 2 g  Every 24 Hours      04/13/17 1100    04/13/17 1255  Pneumococcal Conjugate Vaccine 13-Valent All  Once,   Status:  Canceled      04/13/17 1255    04/13/17 1238  Throat Culture  Once      04/13/17 1237    04/13/17 1238  Rapid Strep A Screen  Once      04/13/17 1237    04/13/17 1127  Inpatient Consult to Infectious Diseases  Once     Specialty:  Infectious Diseases  Provider:  Pedro Edmonds MD    04/13/17 1126    04/13/17 1126  Inpatient Admission  Once      04/13/17 1126    04/13/17 1101  IgG, IgA, IgM  Once      04/13/17 1101    04/13/17 1101  IgG subclasses (1-4)  Once      04/13/17 1101    04/13/17 1058  Respiratory Culture  Once      04/13/17 1057    04/13/17 0600  CBC (No Diff)  Morning Draw      04/12/17 1910    04/12/17 2100  enoxaparin (LOVENOX) syringe 40 mg  Every 12 Hours Scheduled      04/12/17 1511    04/12/17 1845  iopamidol (ISOVUE-300) 61 % injection 100 mL  Once in Imaging      04/12/17 1810    04/12/17 1730  cefTRIAXone (ROCEPHIN) IVPB 1 g  Every 24 Hours,   Status:  Discontinued      04/12/17 1653    04/12/17 1653  HYDROmorphone (DILAUDID) injection 0.5 mg  Every 2 Hours PRN      04/12/17 1653    04/12/17 1600  Vital Signs  Every 4 Hours      04/12/17 1511    04/12/17 1600  Strict Intake and Output  Every  Hour      04/12/17 1511    04/12/17 1512  VTE Risk Assessment - Moderate Risk  Once      04/12/17 1511    04/12/17 1511  CT Chest With Contrast  1 Time Imaging      04/12/17 1511    04/12/17 1510  bisacodyl (DULCOLAX) EC tablet 5 mg  Daily PRN      04/12/17 1511    04/12/17 1510  ondansetron (ZOFRAN) tablet 4 mg  Every 6 Hours PRN      04/12/17 1511    04/12/17 1510  ondansetron ODT (ZOFRAN-ODT) disintegrating tablet 4 mg  Every 6 Hours PRN      04/12/17 1511    04/12/17 1510  ondansetron (ZOFRAN) injection 4 mg  Every 6 Hours PRN      04/12/17 1511    04/12/17 1510  HYDROcodone-acetaminophen (NORCO) 5-325 MG per tablet 1 tablet  Every 4 Hours PRN      04/12/17 1511    04/12/17 1510  ECG 12 Lead  Once      04/12/17 1511    04/12/17 1510  Basic Metabolic Panel  STAT      04/12/17 1511    04/12/17 1510  CBC Auto Differential  STAT      04/12/17 1511    04/12/17 1510  BNP  STAT      04/12/17 1511    04/12/17 1510  CK  STAT      04/12/17 1511    04/12/17 1510  Procalcitonin  STAT      04/12/17 1511    04/12/17 1510  Troponin  STAT      04/12/17 1511    04/12/17 1509  Initiate Observation Status  Once      04/12/17 1511    04/12/17 1509  Full Code  Continuous      04/12/17 1511    04/12/17 1509  Mechanical VTE Prophylaxis Not Indicated: Moderate VTE Risk With Pharmacologic Prophylaxis  Once      04/12/17 1511    04/12/17 1509  Diet Regular  Diet Effective Now      04/12/17 1511    04/12/17 1508  Initiate Observation Status  Once      04/12/17 1511    04/12/17 1508  Weigh Patient  Once      04/12/17 1511    04/12/17 1508  Oxygen Therapy-  Continuous      04/12/17 1511    04/12/17 1508  Insert Peripheral IV  Once      04/12/17 1511    04/12/17 1508  Saline Lock & Maintain IV Access  Continuous      04/12/17 1511    04/12/17 1507  sodium chloride 0.9 % flush 1-10 mL  As Needed      04/12/17 1511    01/24/17 0000  traZODone (DESYREL) 50 MG tablet  Nightly      04/13/17 1128    01/12/17 0000  vilazodone (VIIBRYD) 40 MG tablet  tablet  Nightly      04/13/17 1128          Operative/Procedure Notes (last 24 hours) (Notes from 4/12/2017  2:22 PM through 4/13/2017  2:22 PM)     No notes of this type exist for this encounter.           Physician Progress Notes (last 24 hours) (Notes from 4/12/2017  2:22 PM through 4/13/2017  2:22 PM)      Chriss Mason MD at 4/13/2017 11:08 AM  Version 1 of 1           PROGRESS NOTE   LOS: 1 day   Patient Care Team:  Kiah Gonzales MD as PCP - General (Internal Medicine)    Chief Complaint: Pleurisy    Interval History: Patient was admitted on April 12, 2017 with right upper lung pneumonia.  He was ruled out for pulmonary embolism since he had some pleurisy and wedge shaped infiltrate and was started on antibiotic with Rocephin 1 g every 24 hours.  Patient is having some productive cough with yellowish secretion.  This happened after he had bronchoscopy for therapeutic alveolar lavage of the right lung for his underlying pulmonary alveolar proteinosis disease in [place] with Dr. Velazquez/Ry.  The patient had a symptom onset the day afterwards.  He had a CAT scan and the case was discussed with the doctor from [place] and that focal dense infiltrate in the right upper lobe was not present and it's felt to be an acute infectious process.  Patient is still having pleurisy and he is getting IV Dilaudid for pain control he does not look worse today however given his previous severe complication that required mechanical ventilator, he needs to stay in for more observation and we will extend his periphery hour observation to a full admission.  He has been afebrile.  Patient denies any dyspnea on room air at rest, he did have some minor desaturation at night and had to be placed on nasal cannula oxygen while asleep  He is working with the deep breathing exercises    REVIEW OF SYSTEMS:   CARDIOVASCULAR: No chest pain, chest pressure or chest discomfort. No palpitations or edema.   RESPIRATORY: No shortness  "of breath, cough or sputum.  Positive pleurisy over the right upper lobe anterior laterally corresponding exactly to the area of the consolidation on the CT scan   GASTROINTESTINAL: No anorexia, nausea, vomiting or diarrhea. No abdominal pain or blood.   HEMATOLOGIC: No bleeding or bruising.     Ventilator/Non-Invasive Ventilation Settings     None            Vital Signs  Temp:  [97.1 °F (36.2 °C)-98.1 °F (36.7 °C)] 98.1 °F (36.7 °C)  Heart Rate:  [51-75] 57  Resp:  [18-20] 20  BP: ()/(56-66) 108/66  SpO2:  [96 %-99 %] 96 %  on  Flow (L/min):  [2] 2 O2 Device: room air  No intake or output data in the 24 hours ending 04/13/17 1108  Flowsheet Rows         First Filed Value    Admission Height  71\" (180.3 cm) Documented at 04/12/2017 1500    Admission Weight  180 lb (81.6 kg) Documented at 04/12/2017 1500        Last 3 weights    04/12/17  1500 04/13/17  0500   Weight: 180 lb (81.6 kg) 175 lb 4.8 oz (79.5 kg)       Physical Exam:  GEN:  No acute distress, alert, cooperative, well developed   EYES:   Sclera clear. No icterus. PERRL. Normal EOM  ENT:   External ears/nose normal, no oral lesions, no thrush, mucous membranes moist  NECK:  Supple, midline trachea, no JVD  LUNGS: Normal chest on inspection, CTAB, no wheezes. No rhonchi. No crackles. Respirations regular, even and unlabored.  No audible crackles over the right lung  CV:  Regular rhythm and rate. Normal S1/S2. No murmurs, gallops, or rubs noted.  ABD:  Soft, non-tender and non-distended. Normal bowel sounds. No guarding  EXT:  Moves all extremities well. No cyanosis. No redness. No edema.   Skin: dry, intact, no bleeding    Results Review:        Results from last 7 days  Lab Units 04/12/17  1606   SODIUM mmol/L 141   POTASSIUM mmol/L 4.3   CHLORIDE mmol/L 102   TOTAL CO2 mmol/L 28.1   BUN mg/dL 6   CREATININE mg/dL 1.10   CALCIUM mg/dL 9.0       Results from last 7 days  Lab Units 04/12/17  1606   CK TOTAL U/L 128   TROPONIN T ng/mL <0.010 "       Results from last 7 days  Lab Units 04/13/17  0519 04/12/17  1606   WBC 10*3/mm3 13.67* 19.62*   HEMOGLOBIN g/dL 12.7* 12.7*   HEMATOCRIT % 38.4* 38.3*   PLATELETS 10*3/mm3 305 298   NEUTROPHIL % %  --  70.2   LYMPHOCYTE % %  --  19.6   MONOCYTES % %  --  9.1   EOSINOPHIL % %  --  0.6   BASOPHIL % %  --  0.2   IMM GRAN % %  --  0.3                         No results found for: POCGLU    Results from last 7 days  Lab Units 04/12/17  1606   PROCALCITONIN ng/mL 0.16       Results from last 7 days  Lab Units 04/12/17  1606   PROBNP pg/mL 394.2   Previous culture results from his NOV/DEC admission:                            Imaging Results (all)     Procedure Component Value Units Date/Time    CT Chest With Contrast [[id number]] Collected:  04/12/17 1842     Updated:  04/12/17 1856    Narrative:       CT CHEST W CONTRAST-     INDICATIONS: Pulmonary infiltrate     TECHNIQUE:     ENHANCED CHEST CT     COMPARISON: 11/06/2016     FINDINGS:              The heart size is normal without pericardial effusion.     A pretracheal lymph node measuring 7 mm in short axis on image 41 of  series 2, previously 9 mm in short axis. A right hilar lymph node  measuring 1.6 cm in short axis on image 52 of series 2, less well  visualized on prior exam but not definitely changed. Subcarinal lymph  node measuring 1.3 cm in short axis on image 55, not significantly  changed. These lymph nodes are nonspecific, could be reactive in nature,  potentially evidence of neoplasm, clinical correlation follow-up  suggested (if indicated, positron emission tomography could be  considered for further evaluation).           Central pulmonary arteries opacify normally. Minimal bilateral  gynecomastia is seen.     The airways appear clear.     No pleural effusion or pneumothorax.     The lungs show a consolidative and patchy infiltrate predominantly  involving the right upper lobe, for example image 34 series 5, also  involving the upper aspect of the  right lower lobe, for example image 50  of series 5. Thickening of the right major fissure is noted at the upper  aspect. In the bilateral lower lobes, increased since of groundglass  density, for example image 48 of series 3 may be inflammatory or  infectious in nature, could be evidence of edema. Minimal groundglass  density is apparent in the left upper lobe. Linear likely atelectasis or  scar is seen in the right lower lobe. Overall, appearance of the lungs  is significantly improved from the prior CT exam from 11/06/2016.                 Upper abdominal structures show mild enlargement of the spleen, 13.3 cm,  decreased from prior exam, previously 15.4 cm. Appearance of mild  gallbladder wall thickening, likely at least in part from lack of  distention, correlate clinically, further evaluation can be obtained as  may be clinically indicated. Stomach is distended with contents that can  be result of recent ingestion, or could be evidence of gastroparesis or  restriction of gastric outlet/gastric outlet obstruction; if indicated,  further evaluation could be obtained.           Degenerative changes are seen in the spine. No acute fracture is  identified.       Impression:             1. Patchy and consolidative infiltrative process predominantly involving  the right upper lung, with multifocal areas of groundglass density in  the rest of the lung. Right hilar, mediastinal adenopathy, nonspecific.   Continued follow-up/further evaluation recommended as indications  persist.  2. Mild thickening of the gallbladder wall may be related to lack of  distention, correlate clinically; if further evaluation is indicated,  consider ultrasound and/or hepatobiliary scintigraphy.     3. Decreased splenomegaly.     4. Stomach distended with contents.     This report was finalized on 4/12/2017 6:53 PM by Dr. Kermit Choi MD.             I reviewed the patient's new clinical results.  I personally viewed and interpreted  the patient's imaging results: The films were discussed with Dr. Figueredo in [place] and she didn't compare the films with the radiologist and the dense process in the right upper lobe was not present on his March 2017 films.  The background of the groundglass infiltrate seems to be a lot better from the baseline he had in October 2016        Medication Review:     ceftriaxone 2 g Intravenous Q24H   enoxaparin 40 mg Subcutaneous Q12H            ASSESSMENT:   Right upper lobe consolidation and pneumonia  Pleurisy secondary to above  Underlying acquired pulmonary alveolar proteinosis  Status post recent alveolar lavage on April 11, 2017 was no symptom prior to that  History of severe pneumonias over the last 5 years requiring hospitalization admission to the intensive care unit twice    PLAN:  Increase Rocephin to 2 g IV every 24 hours and give the next dose now  His alveolar lavage was not sent for any culture since it was a therapeutic test for his underlying proteinosis and we will collect sputum culture however this is after he got a dose of Rocephin yesterday  May consider prophylactic antibiotic before any future bronchoscopy, the case was discussed with infectious diseases and we will do some preliminary workup for any immunodeficiency syndrome, his HIV test from his last admission was negative  We will change to a full admission and we'll repeat chest x-ray in the morning  Discussed with the wife and the patient in details and discussed with Dr. hernandez from infectious diseases        Disposition: Home    Chriss Mason MD  04/13/17  11:08 AM          EMR Dragon/Transcription disclaimer:   Much of this encounter note is an electronic transcription/translation of spoken language to printed text. The electronic translation of spoken language may permit erroneous, or at times, nonsensical words or phrases to be inadvertently transcribed; Although I have reviewed the note for such errors, some may still exist.       Electronically signed by Chriss Mason MD at 4/13/2017 11:25 AM           Consult Notes (last 24 hours) (Notes from 4/12/2017  2:22 PM through 4/13/2017  2:22 PM)      Pedro Lucas MD at 4/13/2017 12:37 PM  Version 1 of 1     Consult Orders:    1. Inpatient Consult to Infectious Diseases [[id number]] ordered by Chriss Mason MD at 04/13/17 1126                Referring Provider: Chriss Mason MD  Reason for Consultation: ? Reason for frequent PNA      Subjective   History of present illness:    Dictation on: 04/13/2017 12:46 PM by: PEDRO LUCAS T [[id number]]     Review of Systems  Pertinent items are noted in HPI, all other systems reviewed and negative    Objective     Physical Exam:   Vital Signs   Temp:  [97.1 °F (36.2 °C)-98.1 °F (36.7 °C)] 98.1 °F (36.7 °C)  Heart Rate:  [51-75] 57  Resp:  [18-20] 20  BP: ()/(56-66) 108/66    GENERAL: Awake and alert, in no acute distress.   HEENT: Oropharynx is clear. Hearing is grossly normal.   EYES: PERRL. No conjunctival injection. No lid lag.   LYMPHATICS: No lymphadenopathy of the neck or axillary regions.   HEART: Regular rate and rhythm. No peripheral edema.   LUNGS: Mild r sided rales with normal respiratory effort.   GI: Soft, nontender, nondistended. No appreciable organomegaly.   SKIN: Warm and dry without cutaneous eruptions   PSYCHIATRIC: Appropriate mood, affect, insight, and judgment.     Results Review:   I reviewed the patient's new clinical results.  WBC 19.6-->13.7  H/H 12.7/38      Cr 1.10      Microbiology: none yet      Radiology:  CT chest, personally reviewed: RUL consolidation and patchy infiltrate    Assessment/Plan   1. Recurrent PNA  2. Pulmonary Alveolar Proteinosis      Patient with pulmonary alveolar proteinosis admitted with pneumonia.  This is his third episode of pneumonia.  The 2 most recent have come post bronchoscopy and I imagine that he is having translocation of oral maria l into his pulmonary  parenchyma, which blossoms into a pneumonia in the setting of underlying lung disease.  I doubt primary or acquired immunodeficiency given lack of any other infections outside of the lungs.  That said, I think I'd check immunoglobulins which have already been ordered.  HIV was negative last year.  Last admission he had group A streptococci pneumonia and we will screen for chronic carrier of this by checking throat culture and rapid group A strep antigen.  If he is indeed colonized with group A strep, we can attempt decolonization with Augmentin which has reportedly better success eradicating group A strep than other beta lactams.  It may be reasonable to screen his wife to to make sure she is not colonized with group A strep if he is positive.    Given underlying chronic lung disease, he would benefit from pneumonia vaccinations ahead of discharge, although this would not prevent repeated group A strep infections.    Agree with empiric ceftriaxone for which she seems to be responding.  Anticipate change to oral antibiotics soon if continues to do well.    Case discussed with Dr. Mason, patient, his wife and mother.    Thank you for this consult.  We will continue to follow along and tailor antibiotics as the patient's clinical course evolves.      Pedro Edmonds MD  04/13/17  12:38 PM           Electronically signed by Pedro Edmonds MD at 4/13/2017  1:05 PM        All medication doses during the admission are shown, including meds that are no longer on order.     Scheduled Meds Sorted by Name for Arturo Canales as of 4/11/17 through 4/13/17       1 Day 3 Days 7 Days 10 Days < Today >    Legend:                          Inactive     Active     Other Encounter    Linked               Medications 04/11/17 04/12/17 04/13/17   cefTRIAXone (ROCEPHIN) IVPB 1 g  Dose: 1 g Freq: Every 24 Hours Route: IV  Indications of Use: PNEUMONIA  Start: 04/12/17 1730 End: 04/13/17 1100     2721             1100-D/C'd          cefTRIAXone (ROCEPHIN) IVPB 2 g  Dose: 2 g Freq: Every 24 Hours Route: IV  Indications of Use: PNEUMONIA  Start: 04/13/17 1300    Admin Instructions:   To be given now  Refrigerate      1220              enoxaparin (LOVENOX) syringe 40 mg  Dose: 40 mg Freq: Every 12 Hours Scheduled Route: SC  Start: 04/12/17 2100    Admin Instructions:   Give subcutaneous in abdomen only. Do not massage site after injection. Do not change dose time until after 48 hrs.     [id number] 2100            iopamidol (ISOVUE-300) 61 % injection 100 mL  Dose: 100 mL Freq: Once in Imaging Route: IV  Start: 04/12/17 1845 End: 04/12/17 1845 1845               pneumococcal conj. 13-valent (PREVNAR-13) vaccine 0.5 mL  Dose: 0.5 mL Freq: Once Route: IM  Start: 04/13/17 1600    Admin Instructions:         1600              traZODone (DESYREL) tablet 150 mg  Dose: 150 mg Freq: Nightly Route: PO  Start: 04/13/17 2100    Admin Instructions:   Take with food.      2100              vilazodone (VIIBRYD) tablet 40 mg  Dose: 40 mg Freq: Daily Route: PO  Start: 04/13/17 2100    Admin Instructions:   Take with food      2100              Medications 04/11/17 04/12/17 04/13/17         Continuous Meds Sorted by Name for Arturo Canales as of 4/11/17 through 4/13/17      Legend:                          Inactive     Active     Other Encounter    Linked               Medications 04/11/17 04/12/17 04/13/17         PRN Meds Sorted by Name for Arturo Canales as of 4/11/17 through 4/13/17      Legend:                          Inactive     Active     Other Encounter    Linked               Medications 04/11/17 04/12/17 04/13/17   bisacodyl (DULCOLAX) EC tablet 5 mg  Dose: 5 mg Freq: Daily PRN Route: PO  PRN Reason: Constipation  Start: 04/12/17 1510    Admin Instructions:   Swallow whole. Do not crush, split, or chew tablet.         HYDROcodone-acetaminophen (NORCO) 5-325 MG per tablet 1 tablet  Dose: 1 tablet Freq: Every 4  Hours PRN Route: PO  PRN Reason: Moderate Pain (4-6)  Start: 04/12/17 1510 End: 04/22/17 1509    Admin Instructions:   Do not exceed 4 grams of acetaminophen in a 24 hr period.     1640            0944     1420            HYDROmorphone (DILAUDID) injection 0.5 mg  Dose: 0.5 mg Freq: Every 2 Hours PRN Route: IV  PRN Reason: Severe Pain (7-10)  Start: 04/12/17 1653 End: 04/22/17 1652     1720     1935     2136        1146              ondansetron (ZOFRAN) tablet 4 mg  Dose: 4 mg Freq: Every 6 Hours PRN Route: PO  PRN Reasons: Nausea,Vomiting  Start: 04/12/17 1510    Admin Instructions:   If BOTH ondansetron (ZOFRAN) and promethazine (PHENERGAN) are ordered use ondansetron first and THEN promethazine IF ondansetron is ineffective.         Or  ondansetron ODT (ZOFRAN-ODT) disintegrating tablet 4 mg  Dose: 4 mg Freq: Every 6 Hours PRN Route: PO  PRN Reasons: Nausea,Vomiting  Start: 04/12/17 1510    Admin Instructions:   If BOTH ondansetron (ZOFRAN) and promethazine (PHENERGAN) are ordered use ondansetron first and THEN promethazine IF ondansetron is ineffective.  Place on tongue and allow to dissolve. Maxmimum Dose is 4 mg orally every 6 hours per pharmacy and therapeutics committee         Or  ondansetron (ZOFRAN) injection 4 mg  Dose: 4 mg Freq: Every 6 Hours PRN Route: IV  PRN Reasons: Nausea,Vomiting  Start: 04/12/17 1510    Admin Instructions:   If BOTH ondansetron (ZOFRAN) and promethazine (PHENERGAN) are ordered use ondansetron first and THEN promethazine IF ondansetron is ineffective.         sodium chloride 0.9 % flush 1-10 mL  Dose: 1-10 mL Freq: As Needed Route: IV  PRN Reason: Line Care  Start: 04/12/17 1507         Medications 04/11/17 04/12/17 04/13/17

## 2017-04-13 NOTE — PROGRESS NOTES
Discharge Planning Assessment  [facility]     Patient Name: Arturo Canales  MRN: [id number]  Today's Date: 4/13/2017    Admit Date: 4/12/2017          Discharge Needs Assessment       04/13/17 1724    Living Environment    Lives With spouse;child(maria del rosario), dependent    Living Arrangements house    Quality Of Family Relationships supportive    Able to Return to Prior Living Arrangements yes    Discharge Needs Assessment    Equipment Currently Used at Home none    Transportation Available car;family or friend will provide    Discharge Planning Comments S/w pt at bedside.  Facesheet info confirmed.  Pt lives in a house with his wife and 3 dependent children.  He is IADLs and can drive.  No hx of DME, HH, or SNF.              Discharge Plan       04/13/17 1725    Case Management/Social Work Plan    Plan Pt plans to return home with his family upon DC.  Denies any DC needs at present.                Demographic Summary       04/13/17 1723    Referral Information    Admission Type inpatient    Arrived From home or self-care    Referral Source admission list    Reason For Consult discharge planning    Record Reviewed medical record            Functional Status       04/13/17 1723    Functional Status Current    Ambulation 0-->independent    Transferring 0-->independent    Toileting 0-->independent    Bathing 0-->independent    Dressing 0-->independent    Eating 0-->independent    Communication 0-->understands/communicates without difficulty    Swallowing (if score 2 or more for any item, consult Rehab Services) 0-->swallows foods/liquids without difficulty    Functional Status Prior    Ambulation 0-->independent    Transferring 0-->independent    Toileting 0-->independent    Bathing 0-->independent    Dressing 0-->independent    Eating 0-->independent    Communication 0-->understands/communicates without difficulty    Swallowing 0-->swallows foods/liquids without difficulty    IADL    Medications independent    Meal  Preparation independent    Housekeeping independent    Laundry independent    Shopping independent    Oral Care independent    Activity Tolerance    Current Activity Limitations none    Cognitive/Perceptual/Developmental    Current Mental Status/Cognitive Functioning no deficits noted             Ashley Espinoza RN

## 2017-04-13 NOTE — PROGRESS NOTES
PROGRESS NOTE   LOS: 1 day   Patient Care Team:  Kiah Gonzales MD as PCP - General (Internal Medicine)    Chief Complaint: Pleurisy    Interval History: Patient was admitted on April 12, 2017 with right upper lung pneumonia.  He was ruled out for pulmonary embolism since he had some pleurisy and wedge shaped infiltrate and was started on antibiotic with Rocephin 1 g every 24 hours.  Patient is having some productive cough with yellowish secretion.  This happened after he had bronchoscopy for therapeutic alveolar lavage of the right lung for his underlying pulmonary alveolar proteinosis disease in [place] with Dr. Velazquez/Ry.  The patient had a symptom onset the day afterwards.  He had a CAT scan and the case was discussed with the doctor from [place] and that focal dense infiltrate in the right upper lobe was not present and it's felt to be an acute infectious process.  Patient is still having pleurisy and he is getting IV Dilaudid for pain control he does not look worse today however given his previous severe complication that required mechanical ventilator, he needs to stay in for more observation and we will extend his periphery hour observation to a full admission.  He has been afebrile.  Patient denies any dyspnea on room air at rest, he did have some minor desaturation at night and had to be placed on nasal cannula oxygen while asleep  He is working with the deep breathing exercises    REVIEW OF SYSTEMS:   CARDIOVASCULAR: No chest pain, chest pressure or chest discomfort. No palpitations or edema.   RESPIRATORY: No shortness of breath, cough or sputum.  Positive pleurisy over the right upper lobe anterior laterally corresponding exactly to the area of the consolidation on the CT scan   GASTROINTESTINAL: No anorexia, nausea, vomiting or diarrhea. No abdominal pain or blood.   HEMATOLOGIC: No bleeding or bruising.     Ventilator/Non-Invasive Ventilation Settings     None            Vital  "Signs  Temp:  [97.1 °F (36.2 °C)-98.1 °F (36.7 °C)] 98.1 °F (36.7 °C)  Heart Rate:  [51-75] 57  Resp:  [18-20] 20  BP: ()/(56-66) 108/66  SpO2:  [96 %-99 %] 96 %  on  Flow (L/min):  [2] 2 O2 Device: room air  No intake or output data in the 24 hours ending 04/13/17 1108  Flowsheet Rows         First Filed Value    Admission Height  71\" (180.3 cm) Documented at 04/12/2017 1500    Admission Weight  180 lb (81.6 kg) Documented at 04/12/2017 1500        Last 3 weights    04/12/17  1500 04/13/17  0500   Weight: 180 lb (81.6 kg) 175 lb 4.8 oz (79.5 kg)       Physical Exam:  GEN:  No acute distress, alert, cooperative, well developed   EYES:   Sclera clear. No icterus. PERRL. Normal EOM  ENT:   External ears/nose normal, no oral lesions, no thrush, mucous membranes moist  NECK:  Supple, midline trachea, no JVD  LUNGS: Normal chest on inspection, CTAB, no wheezes. No rhonchi. No crackles. Respirations regular, even and unlabored.  No audible crackles over the right lung  CV:  Regular rhythm and rate. Normal S1/S2. No murmurs, gallops, or rubs noted.  ABD:  Soft, non-tender and non-distended. Normal bowel sounds. No guarding  EXT:  Moves all extremities well. No cyanosis. No redness. No edema.   Skin: dry, intact, no bleeding    Results Review:        Results from last 7 days  Lab Units 04/12/17  1606   SODIUM mmol/L 141   POTASSIUM mmol/L 4.3   CHLORIDE mmol/L 102   TOTAL CO2 mmol/L 28.1   BUN mg/dL 6   CREATININE mg/dL 1.10   CALCIUM mg/dL 9.0       Results from last 7 days  Lab Units 04/12/17  1606   CK TOTAL U/L 128   TROPONIN T ng/mL <0.010       Results from last 7 days  Lab Units 04/13/17  0519 04/12/17  1606   WBC 10*3/mm3 13.67* 19.62*   HEMOGLOBIN g/dL 12.7* 12.7*   HEMATOCRIT % 38.4* 38.3*   PLATELETS 10*3/mm3 305 298   NEUTROPHIL % %  --  70.2   LYMPHOCYTE % %  --  19.6   MONOCYTES % %  --  9.1   EOSINOPHIL % %  --  0.6   BASOPHIL % %  --  0.2   IMM GRAN % %  --  0.3                         No results found " for: POCGLU    Results from last 7 days  Lab Units 04/12/17  1606   PROCALCITONIN ng/mL 0.16       Results from last 7 days  Lab Units 04/12/17  1606   PROBNP pg/mL 394.2   Previous culture results from his NOV/DEC admission:                            Imaging Results (all)     Procedure Component Value Units Date/Time    CT Chest With Contrast [[id number]] Collected:  04/12/17 1842     Updated:  04/12/17 1856    Narrative:       CT CHEST W CONTRAST-     INDICATIONS: Pulmonary infiltrate     TECHNIQUE:     ENHANCED CHEST CT     COMPARISON: 11/06/2016     FINDINGS:              The heart size is normal without pericardial effusion.     A pretracheal lymph node measuring 7 mm in short axis on image 41 of  series 2, previously 9 mm in short axis. A right hilar lymph node  measuring 1.6 cm in short axis on image 52 of series 2, less well  visualized on prior exam but not definitely changed. Subcarinal lymph  node measuring 1.3 cm in short axis on image 55, not significantly  changed. These lymph nodes are nonspecific, could be reactive in nature,  potentially evidence of neoplasm, clinical correlation follow-up  suggested (if indicated, positron emission tomography could be  considered for further evaluation).           Central pulmonary arteries opacify normally. Minimal bilateral  gynecomastia is seen.     The airways appear clear.     No pleural effusion or pneumothorax.     The lungs show a consolidative and patchy infiltrate predominantly  involving the right upper lobe, for example image 34 series 5, also  involving the upper aspect of the right lower lobe, for example image 50  of series 5. Thickening of the right major fissure is noted at the upper  aspect. In the bilateral lower lobes, increased since of groundglass  density, for example image 48 of series 3 may be inflammatory or  infectious in nature, could be evidence of edema. Minimal groundglass  density is apparent in the left upper lobe. Linear likely  atelectasis or  scar is seen in the right lower lobe. Overall, appearance of the lungs  is significantly improved from the prior CT exam from 11/06/2016.                 Upper abdominal structures show mild enlargement of the spleen, 13.3 cm,  decreased from prior exam, previously 15.4 cm. Appearance of mild  gallbladder wall thickening, likely at least in part from lack of  distention, correlate clinically, further evaluation can be obtained as  may be clinically indicated. Stomach is distended with contents that can  be result of recent ingestion, or could be evidence of gastroparesis or  restriction of gastric outlet/gastric outlet obstruction; if indicated,  further evaluation could be obtained.           Degenerative changes are seen in the spine. No acute fracture is  identified.       Impression:             1. Patchy and consolidative infiltrative process predominantly involving  the right upper lung, with multifocal areas of groundglass density in  the rest of the lung. Right hilar, mediastinal adenopathy, nonspecific.   Continued follow-up/further evaluation recommended as indications  persist.  2. Mild thickening of the gallbladder wall may be related to lack of  distention, correlate clinically; if further evaluation is indicated,  consider ultrasound and/or hepatobiliary scintigraphy.     3. Decreased splenomegaly.     4. Stomach distended with contents.     This report was finalized on 4/12/2017 6:53 PM by Dr. Kermit Choi MD.             I reviewed the patient's new clinical results.  I personally viewed and interpreted the patient's imaging results: The films were discussed with Dr. Figueredo in [place] and she didn't compare the films with the radiologist and the dense process in the right upper lobe was not present on his March 2017 films.  The background of the groundglass infiltrate seems to be a lot better from the baseline he had in October 2016        Medication Review:     ceftriaxone  2 g Intravenous Q24H   enoxaparin 40 mg Subcutaneous Q12H            ASSESSMENT:   Right upper lobe consolidation and pneumonia  Pleurisy secondary to above  Underlying acquired pulmonary alveolar proteinosis  Status post recent alveolar lavage on April 11, 2017 was no symptom prior to that  History of severe pneumonias over the last 5 years requiring hospitalization admission to the intensive care unit twice    PLAN:  Increase Rocephin to 2 g IV every 24 hours and give the next dose now  His alveolar lavage was not sent for any culture since it was a therapeutic test for his underlying proteinosis and we will collect sputum culture however this is after he got a dose of Rocephin yesterday  May consider prophylactic antibiotic before any future bronchoscopy, the case was discussed with infectious diseases and we will do some preliminary workup for any immunodeficiency syndrome, his HIV test from his last admission was negative  We will change to a full admission and we'll repeat chest x-ray in the morning  Discussed with the wife and the patient in details and discussed with Dr. hernandez from infectious diseases        Disposition: Home    Chriss Mason MD  04/13/17  11:08 AM          EMR Dragon/Transcription disclaimer:   Much of this encounter note is an electronic transcription/translation of spoken language to printed text. The electronic translation of spoken language may permit erroneous, or at times, nonsensical words or phrases to be inadvertently transcribed; Although I have reviewed the note for such errors, some may still exist.

## 2017-04-13 NOTE — CONSULTS
Referring Provider: Chriss Mason MD  Reason for Consultation: ? Reason for frequent PNA      Subjective   History of present illness:    This is a very nice 41-year-old gentleman with a history of protein alveolar proteinosis who was admitted on 04/12/2017 with pneumonia.  The Infectious Disease Service was asked for evaluation and to determine if there is any underlying reason for frequent pneumonia.    Per review of the past medical record, he was recently hospitalized here at [facility] in 10/2016 and 11/2016 for group A streptococcal septicemia as well as acute hypoxic respiratory failure secondary to bilateral pneumonia from group A strep and also H. flu which was superimposed on new diagnosis of pulmonary alveolar proteinosis.  He was evaluated at that time by my colleague, Dr. Davey Cristobal, and he was treated with 4 weeks of ceftriaxone.   He did well and for his pulmonary alveolar proteinosis was referred to an expert in [place]. The patient says that about 3 weeks ago he underwent whole lung lavage on his left lung and did well after that. He had some mild cough productive of saline which he was told was not unusual after that lavage. Actually, he felt markedly better than he has in quite some time following his lavage. He says his energy level increased. He was able to do more activity and was actually much more physically active than he had been in the past. On 04/11/2017, he underwent lavage of the right lung in [place].  Shortly thereafter he developed right-sided chest pain that was sharp and radiated straight to the back.  It was progressive and severe and worse with inspiration and better with pain medicine.  Given the progression of his symptoms, he presented to an emergency department actually at [facility].  There was a large wedge-shaped infiltrate on the right and he was transferred to [facility] under the care of Dr. Mason.  I talked to  Dr. Mason and he tells me that he actually had pneumonia about 5-6 years ago.  Additionally he had pneumonia after bronchoscopy back in 10/2016 and that is what prompted the extended hospitalization described above. More recently after this bronchoscopy he has yet had another pneumonia.  Dr. Mason was wondering if there is any type of immunosuppression or immunodeficiency that might have attributed to his frequent pneumonia.  The patient says that he does not have a history of any other types of infections outside the lungs. He denies any sinus infection or any type of strep throat. He is feeling somewhat better since admission and actually wants to be discharged.      PAST MEDICAL HISTORY:    1.  Group A streptococcal septicemia and H. flu pneumonia in 11/2016.  2.  History of pneumonia in about 2010.  3.  Protein alveolar proteinosis.   4.  Back surgery x2.  5.  Wrist fracture surgery on the left.  6.  Bilateral knee arthroscopy.    FAMILY HISTORY: No family history of infectious diseases.  His father had lymphoma.    ALLERGIES:  No known drug allergies.     SOCIAL HISTORY:  He is  and lives in [place], [place] with his wife. He is originally from [place].     Review of Systems  Pertinent items are noted in HPI, all other systems reviewed and negative    Objective     Physical Exam:   Vital Signs   Temp:  [97.1 °F (36.2 °C)-98.1 °F (36.7 °C)] 98.1 °F (36.7 °C)  Heart Rate:  [51-75] 57  Resp:  [18-20] 20  BP: ()/(56-66) 108/66    GENERAL: Awake and alert, in no acute distress.   HEENT: Oropharynx is clear. Hearing is grossly normal.   EYES: PERRL. No conjunctival injection. No lid lag.   LYMPHATICS: No lymphadenopathy of the neck or axillary regions.   HEART: Regular rate and rhythm. No peripheral edema.   LUNGS: Mild r sided rales with normal respiratory effort.   GI: Soft, nontender, nondistended. No appreciable organomegaly.   SKIN: Warm and dry without cutaneous eruptions   PSYCHIATRIC:  Appropriate mood, affect, insight, and judgment.     Results Review:   I reviewed the patient's new clinical results.  WBC 19.6-->13.7  H/H 12.7/38      Cr 1.10      Microbiology: none yet      Radiology:  CT chest, personally reviewed: RUL consolidation and patchy infiltrate    Assessment/Plan   1. Recurrent PNA  2. Pulmonary Alveolar Proteinosis      Patient with pulmonary alveolar proteinosis admitted with pneumonia.  This is his third episode of pneumonia.  The 2 most recent have come post bronchoscopy and I imagine that he is having translocation of oral maria l into his pulmonary parenchyma, which blossoms into a pneumonia in the setting of underlying lung disease.  I doubt primary or acquired immunodeficiency given lack of any other infections outside of the lungs.  That said, I think I'd check immunoglobulins which have already been ordered.  HIV was negative last year.  Last admission he had group A streptococci pneumonia and we will screen for chronic carrier of this by checking throat culture and rapid group A strep antigen.  If he is indeed colonized with group A strep, we can attempt decolonization with Augmentin which has reportedly better success eradicating group A strep than other beta lactams.  It may be reasonable to screen his wife to to make sure she is not colonized with group A strep if he is positive.    Given underlying chronic lung disease, he would benefit from pneumonia vaccinations ahead of discharge, although this would not prevent repeated group A strep infections.    Agree with empiric ceftriaxone for which she seems to be responding.  Anticipate change to oral antibiotics soon if continues to do well.      He would likely benefit from periprocedural antibiotics in the future.    Case discussed with Dr. Mason, patient, his wife and mother.    Thank you for this consult.  We will continue to follow along and tailor antibiotics as the patient's clinical course evolves.      Pedro  Kris Edmonds MD  04/13/17  12:38 PM

## 2017-04-13 NOTE — PLAN OF CARE
Problem: Patient Care Overview (Adult)  Goal: Plan of Care Review  Outcome: Ongoing (interventions implemented as appropriate)    04/13/17 0215   Outcome Evaluation   Outcome Summary/Follow up Plan Still has some chest pain when deep breathing. Receiving Dilaudid as needed for pain. 2L oxygen placed due to patient desating to 87%-89% while sleeping. Will continue to monitor.    Coping/Psychosocial Response Interventions   Plan Of Care Reviewed With patient   Patient Care Overview   Progress no change       Goal: Adult Individualization and Mutuality  Outcome: Ongoing (interventions implemented as appropriate)  Goal: Discharge Needs Assessment  Outcome: Ongoing (interventions implemented as appropriate)    Problem: Pain, Acute (Adult)  Goal: Acceptable Pain Control/Comfort Level  Outcome: Ongoing (interventions implemented as appropriate)

## 2017-04-14 ENCOUNTER — APPOINTMENT (OUTPATIENT)
Dept: GENERAL RADIOLOGY | Facility: HOSPITAL | Age: 42
End: 2017-04-14
Attending: INTERNAL MEDICINE

## 2017-04-14 VITALS
BODY MASS INDEX: 24.54 KG/M2 | SYSTOLIC BLOOD PRESSURE: 114 MMHG | HEIGHT: 71 IN | HEART RATE: 54 BPM | WEIGHT: 175.3 LBS | DIASTOLIC BLOOD PRESSURE: 82 MMHG | RESPIRATION RATE: 16 BRPM | TEMPERATURE: 97.6 F | OXYGEN SATURATION: 96 %

## 2017-04-14 PROCEDURE — 90732 PPSV23 VACC 2 YRS+ SUBQ/IM: CPT | Performed by: INTERNAL MEDICINE

## 2017-04-14 PROCEDURE — 25010000002 PNEUMOCOCCAL VAC POLYVALENT PER 0.5 ML: Performed by: INTERNAL MEDICINE

## 2017-04-14 PROCEDURE — 25010000003 CEFTRIAXONE PER 250 MG: Performed by: INTERNAL MEDICINE

## 2017-04-14 PROCEDURE — 71020 HC CHEST PA AND LATERAL: CPT

## 2017-04-14 PROCEDURE — G0009 ADMIN PNEUMOCOCCAL VACCINE: HCPCS | Performed by: INTERNAL MEDICINE

## 2017-04-14 PROCEDURE — 99232 SBSQ HOSP IP/OBS MODERATE 35: CPT | Performed by: INTERNAL MEDICINE

## 2017-04-14 RX ORDER — LEVOFLOXACIN 750 MG/1
750 TABLET ORAL EVERY 24 HOURS
Status: DISCONTINUED | OUTPATIENT
Start: 2017-04-15 | End: 2017-04-14 | Stop reason: HOSPADM

## 2017-04-14 RX ORDER — LEVOFLOXACIN 750 MG/1
750 TABLET ORAL EVERY 24 HOURS
Qty: 4 TABLET | Refills: 0 | Status: SHIPPED | OUTPATIENT
Start: 2017-04-15 | End: 2017-04-19

## 2017-04-14 RX ADMIN — PNEUMOCOCCAL VACCINE POLYVALENT 0.5 ML
25; 25; 25; 25; 25; 25; 25; 25; 25; 25; 25; 25; 25; 25; 25; 25; 25; 25; 25; 25; 25; 25; 25 INJECTION, SOLUTION INTRAMUSCULAR; SUBCUTANEOUS at 14:26

## 2017-04-14 RX ADMIN — CEFTRIAXONE SODIUM 2 G: 2 INJECTION, SOLUTION INTRAVENOUS at 13:41

## 2017-04-14 NOTE — DISCHARGE SUMMARY
DISCHARGE SUMMARY    Patient Name: Arturo Canales  Age/Sex: 41 y.o. male  : 1975  MRN: [id number]  Patient Care Team:  Kiah Gonzales MD as PCP - General (Internal Medicine)       Date of Admit: 2017  Date of Discharge:  17  Discharge Condition: Good    Discharge Diagnoses:  Right upper lobe consolidation and pneumonia  Pleurisy secondary to above  Underlying acquired pulmonary alveolar proteinosis  Status post recent alveolar lavage on 2017 was no symptom prior to that  History of severe pneumonias over the last 5 years requiring hospitalization admission to the intensive care unit twice       History of present illness:   Patient was admitted on 2017 with right upper lung pneumonia. He was ruled out for pulmonary embolism since he had some pleurisy and wedge shaped infiltrate and was started on antibiotic with Rocephin 1 g every 24 hours that was later increased to 2 g/24 hr. Patient was having some productive cough with yellowish secretion. This happened after he had bronchoscopy for therapeutic alveolar lavage of the right lung for his underlying pulmonary alveolar proteinosis disease in [place] with Dr. Velazquez/Ry on 2017  The patient had a symptom onset the day afterwards. He had a CAT scan and the case was discussed with the doctor from [place] and that focal dense infiltrate in the right upper lobe was not present and it's felt to be an acute infectious process.  Hospital Course:    Patient did better with antibiotics, he did not require any dilaudid for the past 12 hours, he was seen by ID and we discussed the need for prophylactic antibiotics before future bronchoscopies.  The patient is on RA, no hypoxemia last night, he does have portable pulse oximeter, no fever for the last 24 hours.      Consults:   IP CONSULT TO INFECTIOUS DISEASES    Significant Discharge Diagnostics   Procedures Performed:         Pertinent Lab Results:    Results from  last 7 days  Lab Units 04/12/17  1606   SODIUM mmol/L 141   POTASSIUM mmol/L 4.3   CHLORIDE mmol/L 102   TOTAL CO2 mmol/L 28.1   BUN mg/dL 6   CREATININE mg/dL 1.10   GLUCOSE mg/dL 93   CALCIUM mg/dL 9.0       Results from last 7 days  Lab Units 04/12/17  1606   CK TOTAL U/L 128   TROPONIN T ng/mL <0.010       Results from last 7 days  Lab Units 04/13/17  0519 04/12/17  1606   WBC 10*3/mm3 13.67* 19.62*   HEMOGLOBIN g/dL 12.7* 12.7*   HEMATOCRIT % 38.4* 38.3*   PLATELETS 10*3/mm3 305 298   MCV fL 88.1 87.0   MCH pg 29.1 28.9   MCHC g/dL 33.1 33.2   RDW % 13.7 13.7   RDW-SD fl 44.3 43.6   MPV fL 9.9 9.8   NEUTROPHIL % %  --  70.2   LYMPHOCYTE % %  --  19.6   MONOCYTES % %  --  9.1   EOSINOPHIL % %  --  0.6   BASOPHIL % %  --  0.2   IMM GRAN % %  --  0.3   NEUTROS ABS 10*3/mm3  --  13.79*   LYMPHS ABS 10*3/mm3  --  3.84   MONOS ABS 10*3/mm3  --  1.79*   EOS ABS 10*3/mm3  --  0.12   BASOS ABS 10*3/mm3  --  0.03   IMMATURE GRANS (ABS) 10*3/mm3  --  0.05*   Results for FAIZAN WEST (MRN [id number]) as of 4/14/2017 13:49   Ref. Range 4/13/2017 11:11   IgA Latest Ref Range: 70 - 400 mg/dL 143   IgG Latest Ref Range: 700 - 1600 mg/dL 703   IgM Latest Ref Range: 40 - 230 mg/dL 110                   Results from last 7 days  Lab Units 04/12/17  1606   PROBNP pg/mL 394.2               Results from last 7 days  Lab Units 04/12/17  1606   PROCALCITONIN ng/mL 0.16               Results from last 7 days  Lab Units 04/13/17  1638   RESPCX  Moderate growth (3+) Normal Respiratory Ele   GRAM STAIN RESULT  Few (2+) WBCs seen  Few (2+) Epithelial cells per low power field  Mixed bacterial morphotypes seen on Gram Stain                   Imaging Results:  Imaging Results (all)     Procedure Component Value Units Date/Time    CT Chest With Contrast [[id number]] Collected:  04/12/17 1842     Updated:  04/12/17 1856    Narrative:       CT CHEST W CONTRAST-     INDICATIONS: Pulmonary infiltrate     TECHNIQUE:     ENHANCED CHEST CT      COMPARISON: 11/06/2016     FINDINGS:              The heart size is normal without pericardial effusion.     A pretracheal lymph node measuring 7 mm in short axis on image 41 of  series 2, previously 9 mm in short axis. A right hilar lymph node  measuring 1.6 cm in short axis on image 52 of series 2, less well  visualized on prior exam but not definitely changed. Subcarinal lymph  node measuring 1.3 cm in short axis on image 55, not significantly  changed. These lymph nodes are nonspecific, could be reactive in nature,  potentially evidence of neoplasm, clinical correlation follow-up  suggested (if indicated, positron emission tomography could be  considered for further evaluation).           Central pulmonary arteries opacify normally. Minimal bilateral  gynecomastia is seen.     The airways appear clear.     No pleural effusion or pneumothorax.     The lungs show a consolidative and patchy infiltrate predominantly  involving the right upper lobe, for example image 34 series 5, also  involving the upper aspect of the right lower lobe, for example image 50  of series 5. Thickening of the right major fissure is noted at the upper  aspect. In the bilateral lower lobes, increased since of groundglass  density, for example image 48 of series 3 may be inflammatory or  infectious in nature, could be evidence of edema. Minimal groundglass  density is apparent in the left upper lobe. Linear likely atelectasis or  scar is seen in the right lower lobe. Overall, appearance of the lungs  is significantly improved from the prior CT exam from 11/06/2016.                 Upper abdominal structures show mild enlargement of the spleen, 13.3 cm,  decreased from prior exam, previously 15.4 cm. Appearance of mild  gallbladder wall thickening, likely at least in part from lack of  distention, correlate clinically, further evaluation can be obtained as  may be clinically indicated. Stomach is distended with contents that can  be result  of recent ingestion, or could be evidence of gastroparesis or  restriction of gastric outlet/gastric outlet obstruction; if indicated,  further evaluation could be obtained.           Degenerative changes are seen in the spine. No acute fracture is  identified.       Impression:             1. Patchy and consolidative infiltrative process predominantly involving  the right upper lung, with multifocal areas of groundglass density in  the rest of the lung. Right hilar, mediastinal adenopathy, nonspecific.   Continued follow-up/further evaluation recommended as indications  persist.  2. Mild thickening of the gallbladder wall may be related to lack of  distention, correlate clinically; if further evaluation is indicated,  consider ultrasound and/or hepatobiliary scintigraphy.     3. Decreased splenomegaly.     4. Stomach distended with contents.     This report was finalized on 4/12/2017 6:53 PM by Dr. Kermit Choi MD.       XR Chest 2 View [[id number]] Collected:  04/14/17 1149     Updated:  04/14/17 1149    Narrative:       2 VIEWS CHEST     HISTORY: Pneumonia.     FINDINGS: 2 views of the chest demonstrate the heart to be within normal  limits in size. There is atelectasis/infiltrate involving the right  upper lobe inferiorly and posteriorly as well as thickening of the  pleura, less prominent as compared to CT examination of 04/12/2017 given  differences in technique. There is no evidence of effusion.               Objective:   Temp:  [97.8 °F (36.6 °C)-97.9 °F (36.6 °C)] 97.8 °F (36.6 °C)  Heart Rate:  [51-71] 51  Resp:  [16-18] 16  BP: ()/(58-74) 102/58   SpO2:  [95 %-98 %] 95 %  on  Flow (L/min):  [2] 2 O2 Device: nasal cannula  No intake or output data in the 24 hours ending 04/14/17 1333  Body mass index is 24.45 kg/(m^2).  Last 3 weights    04/12/17  1500 04/13/17  0500   Weight: 180 lb (81.6 kg) 175 lb 4.8 oz (79.5 kg)     Weight change:     Physical Exam:      General: Alert, cooperative, in no  acute distress.         HEENT:  No oral lesions. No thrush. Oral mucosa moist.   Chest Wall:  No abnormalities observed.             Neck:  Trachea midline. No JVD.   Pulmonary:  CTAB. No wheezes. Respirations regular, even and unlabored.          Cardio:  Regular rhythm and normal rate. Normal S1 and S2. No murmur, gallop, rub or click.    Abdominal:  Soft, non-tender and non-distended. Normal bowel sounds. No masses. No organomegaly. No guarding. No rebound tenderness.  Extremities:  Moves all extremities well. No cyanosis. No redness. No edema.     Discharge Medications and Instructions:     Discharge Medications   Doe Canalescourtney REDD   Home Medication Instructions NICOLLE:[id number]    Printed on:04/14/17 2507   Medication Information                      levoFLOXacin (LEVAQUIN) 750 MG tablet  Take 1 tablet by mouth Daily for 4 days. Indications: Pneumonia             traZODone (DESYREL) 50 MG tablet  Take 150 mg by mouth Every Night.             vilazodone (VIIBRYD) 40 MG tablet tablet  Take 40 mg by mouth Daily.                 Discharge Diet:         Dietary Orders            Start     Ordered    04/12/17 1509  Diet Regular  Diet Effective Now     Question:  Diet Texture / Consistency  Answer:  Regular    04/12/17 1511          Activity at Discharge:       Discharge disposition: Home    Discharge Instructions and Follow ups:  Need CXR in 1 week, will arrange for it as outpatient. Will see prn sooner if needed, he will continue to follow up with Dr Mariee in [facility]  Strongly recommend pre-medicating with Antibiotics before any future procedure with Rocephin IV  IgG/IgM, IgA results were all normal  No future appointments.     Medication Reconciliation: Please see electronically completed Med Rec.    Total time spent discharging patient including evaluation, medication reconciliation, arranging follow up, and post hospitalization instructions and education total time exceeds 30 minutes.     Chriss Mason,  MD  04/14/17  1:33 PM    EMR Dragon/Transcription disclaimer:   Much of this encounter note is an electronic transcription/translation of spoken language to printed text. The electronic translation of spoken language may permit erroneous, or at times, nonsensical words or phrases to be inadvertently transcribed; Although I have reviewed the note for such errors, some may still exist.

## 2017-04-14 NOTE — PLAN OF CARE
Problem: Patient Care Overview (Adult)  Goal: Plan of Care Review  Outcome: Ongoing (interventions implemented as appropriate)    04/14/17 0308   Outcome Evaluation   Outcome Summary/Follow up Plan Vital signs stable. No new complaints. Will continue to monitor.    Coping/Psychosocial Response Interventions   Plan Of Care Reviewed With patient   Patient Care Overview   Progress no change       Goal: Adult Individualization and Mutuality  Outcome: Ongoing (interventions implemented as appropriate)  Goal: Discharge Needs Assessment  Outcome: Ongoing (interventions implemented as appropriate)    Problem: Pain, Acute (Adult)  Goal: Acceptable Pain Control/Comfort Level  Outcome: Ongoing (interventions implemented as appropriate)

## 2017-04-14 NOTE — PROGRESS NOTES
INFECTIOUS DISEASES PROGRESS NOTE    CC: f/u PNA    S:   Pleuritic pain much better  No significant cough  No fevers    O:  Physical Exam:  Temp:  [97.8 °F (36.6 °C)-97.9 °F (36.6 °C)] 97.8 °F (36.6 °C)  Heart Rate:  [51-71] 51  Resp:  [16-18] 16  BP: ()/(58-74) 102/58  Physical Exam   Constitutional: He appears well-developed. No distress.   Cardiovascular: Normal rate.    Pulmonary/Chest: Effort normal. No respiratory distress.   Musculoskeletal: He exhibits no edema.        Diagnostics:      IgG/IgM/IgA nl  IgG subclasses P  Rapid strep neg  Throat cx nl maria l  Sputum cx: mixed maria l on gs    Estimated Creatinine Clearance: 94.1 mL/min (by C-G formula based on Cr of 1.1).    Assessment/Plan   1. RUL PNA  2. Pulmonary alveolar proteinosis    D/w Dr Mason and magdaleno to d/c from my end.  Will d/c ceftriaxone after today's dose and Rx levofloxacin 750mg po q24 x4 more days.    All cultures negative.  I agree with perioperative ceftriaxone ahead of additional whole lung lavages.  Discussed with him proper dosing administration and side effects of medicines.  Update to patient, his wife and mother at bedside today.    Pedro [place]s MD  9:44 AM  04/14/17

## 2017-04-15 LAB
BACTERIA SPEC AEROBE CULT: NORMAL
BACTERIA SPEC RESP CULT: NORMAL
GRAM STN SPEC: NORMAL

## 2017-04-16 LAB
IGG SERPL-MCNC: 680 MG/DL (ref 700–1600)
IGG1 SER-MCNC: 432 MG/DL (ref 422–1292)
IGG2 SER-MCNC: 186 MG/DL (ref 117–747)
IGG3 SER-MCNC: 31 MG/DL (ref 41–129)
IGG4 SER-MCNC: 42 MG/DL (ref 1–291)

## 2017-09-12 ENCOUNTER — LAB (OUTPATIENT)
Dept: LAB | Facility: HOSPITAL | Age: 42
End: 2017-09-12
Attending: INTERNAL MEDICINE

## 2017-09-12 ENCOUNTER — TRANSCRIBE ORDERS (OUTPATIENT)
Dept: PULMONOLOGY | Facility: HOSPITAL | Age: 42
End: 2017-09-12

## 2017-09-12 ENCOUNTER — TRANSCRIBE ORDERS (OUTPATIENT)
Dept: ADMINISTRATIVE | Facility: HOSPITAL | Age: 42
End: 2017-09-12

## 2017-09-12 ENCOUNTER — HOSPITAL ENCOUNTER (OUTPATIENT)
Dept: CT IMAGING | Facility: HOSPITAL | Age: 42
Discharge: HOME | End: 2017-09-12
Attending: INTERNAL MEDICINE | Admitting: INTERNAL MEDICINE

## 2017-09-12 DIAGNOSIS — J84.01 PULMONARY ALVEOLAR PROTEINOSIS (HCC): ICD-10-CM

## 2017-09-12 DIAGNOSIS — R63.4 WEIGHT LOSS: ICD-10-CM

## 2017-09-12 DIAGNOSIS — R63.4 WEIGHT LOSS: Primary | ICD-10-CM

## 2017-09-12 DIAGNOSIS — J84.01 PULMONARY ALVEOLAR PROTEINOSIS (HCC): Primary | ICD-10-CM

## 2017-09-12 LAB
DEPRECATED RDW RBC AUTO: 42.1 FL (ref 37–54)
ERYTHROCYTE [DISTWIDTH] IN BLOOD BY AUTOMATED COUNT: 13.4 % (ref 11.5–14.5)
HCT VFR BLD AUTO: 43 % (ref 40.4–52.2)
HGB BLD-MCNC: 14.6 G/DL (ref 13.7–17.6)
MCH RBC QN AUTO: 29.3 PG (ref 27–32.7)
MCHC RBC AUTO-ENTMCNC: 34 G/DL (ref 32.6–36.4)
MCV RBC AUTO: 86.3 FL (ref 79.8–96.2)
PLATELET # BLD AUTO: 367 10*3/MM3 (ref 140–500)
PMV BLD AUTO: 9 FL (ref 6–12)
RBC # BLD AUTO: 4.98 10*6/MM3 (ref 4.6–6)
TSH SERPL DL<=0.05 MIU/L-ACNC: 1.07 MIU/ML (ref 0.27–4.2)
WBC NRBC COR # BLD: 5.39 10*3/MM3 (ref 4.5–10.7)

## 2017-09-12 PROCEDURE — 82565 ASSAY OF CREATININE: CPT

## 2017-09-12 PROCEDURE — 74177 CT ABD & PELVIS W/CONTRAST: CPT

## 2017-09-12 PROCEDURE — 71260 CT THORAX DX C+: CPT

## 2017-09-12 PROCEDURE — 82533 TOTAL CORTISOL: CPT | Performed by: INTERNAL MEDICINE

## 2017-09-12 PROCEDURE — 36415 COLL VENOUS BLD VENIPUNCTURE: CPT

## 2017-09-12 PROCEDURE — 0 DIATRIZOATE MEGLUMINE & SODIUM PER 1 ML: Performed by: INTERNAL MEDICINE

## 2017-09-12 PROCEDURE — 84439 ASSAY OF FREE THYROXINE: CPT | Performed by: INTERNAL MEDICINE

## 2017-09-12 PROCEDURE — 0 IOPAMIDOL 61 % SOLUTION: Performed by: INTERNAL MEDICINE

## 2017-09-12 PROCEDURE — 84443 ASSAY THYROID STIM HORMONE: CPT

## 2017-09-12 PROCEDURE — 85027 COMPLETE CBC AUTOMATED: CPT

## 2017-09-12 RX ADMIN — IOPAMIDOL 90 ML: 612 INJECTION, SOLUTION INTRAVENOUS at 15:15

## 2017-09-12 RX ADMIN — DIATRIZOATE MEGLUMINE AND DIATRIZOATE SODIUM 30 ML: 660; 100 LIQUID ORAL; RECTAL at 14:15

## 2017-09-13 ENCOUNTER — TRANSCRIBE ORDERS (OUTPATIENT)
Dept: ADMINISTRATIVE | Facility: HOSPITAL | Age: 42
End: 2017-09-13

## 2017-09-13 ENCOUNTER — LAB (OUTPATIENT)
Dept: LAB | Facility: HOSPITAL | Age: 42
End: 2017-09-13
Attending: INTERNAL MEDICINE

## 2017-09-13 DIAGNOSIS — R63.4 WEIGHT LOSS: ICD-10-CM

## 2017-09-13 DIAGNOSIS — R63.4 WEIGHT LOSS: Primary | ICD-10-CM

## 2017-09-13 LAB
CORTIS SERPL-MCNC: 5.59 MCG/DL
CREAT BLDA-MCNC: 1.2 MG/DL (ref 0.6–1.3)
T4 FREE SERPL-MCNC: 1.42 NG/DL (ref 0.93–1.7)

## 2017-09-13 PROCEDURE — 87177 OVA AND PARASITES SMEARS: CPT | Performed by: INTERNAL MEDICINE

## 2017-09-13 PROCEDURE — 87209 SMEAR COMPLEX STAIN: CPT | Performed by: INTERNAL MEDICINE

## 2017-09-19 LAB
O+P SPEC MICRO: NORMAL
OVA + PARASITE RESULT 1: NORMAL

## 2019-01-14 ENCOUNTER — HOSPITAL ENCOUNTER (OUTPATIENT)
Dept: CT IMAGING | Facility: HOSPITAL | Age: 44
Discharge: HOME | End: 2019-01-14
Attending: INTERNAL MEDICINE | Admitting: INTERNAL MEDICINE

## 2019-01-14 DIAGNOSIS — R06.89 DYSPNEA AND RESPIRATORY ABNORMALITIES: Primary | ICD-10-CM

## 2019-01-14 DIAGNOSIS — R06.00 DYSPNEA AND RESPIRATORY ABNORMALITY: Primary | ICD-10-CM

## 2019-01-14 DIAGNOSIS — R06.89 DYSPNEA AND RESPIRATORY ABNORMALITY: Primary | ICD-10-CM

## 2019-01-14 DIAGNOSIS — R06.89 DYSPNEA AND RESPIRATORY ABNORMALITY: ICD-10-CM

## 2019-01-14 DIAGNOSIS — J84.01 PAP (PULMONARY ALVEOLAR PROTEINOSIS) (HCC): ICD-10-CM

## 2019-01-14 DIAGNOSIS — R06.00 DYSPNEA AND RESPIRATORY ABNORMALITY: ICD-10-CM

## 2019-01-14 DIAGNOSIS — R06.00 DYSPNEA AND RESPIRATORY ABNORMALITIES: Primary | ICD-10-CM

## 2019-01-14 DIAGNOSIS — J84.01: ICD-10-CM

## 2019-01-14 PROCEDURE — 71250 CT THORAX DX C-: CPT

## 2019-11-18 PROBLEM — Z87.09 PERSONAL HISTORY OF OTHER DISEASES OF THE RESPIRATORY SYSTEM: Status: ACTIVE | Noted: 2017-02-02

## 2019-11-18 PROBLEM — Z86.19 HISTORY OF SEPSIS: Status: ACTIVE | Noted: 2017-02-03

## 2019-11-19 ENCOUNTER — OFFICE VISIT (OUTPATIENT)
Dept: SURGERY | Facility: CLINIC | Age: 44
End: 2019-11-19

## 2019-11-19 VITALS
HEIGHT: 71 IN | WEIGHT: 181.9 LBS | TEMPERATURE: 97.8 F | SYSTOLIC BLOOD PRESSURE: 110 MMHG | DIASTOLIC BLOOD PRESSURE: 68 MMHG | BODY MASS INDEX: 25.47 KG/M2 | HEART RATE: 79 BPM | OXYGEN SATURATION: 99 %

## 2019-11-19 DIAGNOSIS — K60.2 ANAL FISSURE: Primary | ICD-10-CM

## 2019-11-19 PROCEDURE — 99244 OFF/OP CNSLTJ NEW/EST MOD 40: CPT | Performed by: COLON & RECTAL SURGERY

## 2019-11-19 RX ORDER — LISDEXAMFETAMINE DIMESYLATE 50 MG
50 CAPSULE ORAL EVERY MORNING
Refills: 0 | COMMUNITY
Start: 2019-10-21

## 2019-11-19 NOTE — PROGRESS NOTES
Arturo Canales is a 44 y.o. male who is seen as a consult at the request of Kiah Gonzales MD for anorectal pain and bleeding    HPI:    Pt c/o brutal hemorrhoids  He has pain, bleeding, and swelling, x6 months, but all have gotten worse  He has pain and bleeding after every BM    Topical lido helps a little bit    He has regular, 1 BM per day  Stool consistency like soft served  He denies straining    He does not take any fiber supplements or stool softeners    He has never had a colonoscopy    FamHx: no known hx colon polyps or colon cancer    Past Medical History:   Diagnosis Date   • ADD (attention deficit disorder)    • ARDS (adult respiratory distress syndrome) (CMS/HCC)    • Autoimmune pulmonary alveolar proteinosis (CMS/HCC)     FOLLOWED BY DR. FLORIN SMITH   • BRBPR (bright red blood per rectum) 09/2016   • Disease of thyroid gland     HYPOTHYROIDISM   • Dyspnea    • Fatigue    • Hemorrhoids    • Interstitial lung disease (CMS/HCC)    • Metacarpal bone fracture 07/30/2012    4TH METACARPAL, RIGHT HAND   • Pneumonia 04/12/2017    ADMITTED TO [place]   • Pneumonia 10/28/2016    RIGHT LOWER LOBE, D/T HAEMOPHILUS INFLUENZA, ADMITTED TO [facility]   • Pulmonary fibrosis (CMS/HCC)    • Respiratory failure (CMS/HCC)    • Sepsis (CMS/HCC)    • Syncope and collapse 06/2018   • Wrist fracture        Past Surgical History:   Procedure Laterality Date   • BRONCHOSCOPY N/A 10/27/2016    Procedure: BRONCHOSCOPY WITH FLUORO & CRYO BIOPSY;  Surgeon: Italo Escalera MD;  Location: [facility] ENDOSCOPY;  Service:    • BRONCHOSCOPY N/A 10/31/2016    Procedure: BRONCHOSCOPY BIOPSY AT BEDSIDE;  Surgeon: Florin Smith MD;  Location: [facility] ENDOSCOPY;  Service:    • BRONCHOSCOPY N/A 11/4/2016    Procedure: BRONCHOSCOPY  WITH WASHINGS;  Surgeon: Florin Smith MD;  Location: [facility] ENDOSCOPY;  Service:    • KNEE ARTHROSCOPY Bilateral    • LUMBAR SPINE SURGERY N/A     L5, DR. HUMBERTO KEN   • LUNG SURGERY Right 05/18/2018    RIGHT  LUNG LAVAGE WITH REPIRATORY PERCUSSION VEST, DR. JONE LUNSFORD AT [facility]   • LUNG SURGERY Left 05/15/2018    LEFT LUNG LAVAGE WITH REPIRATORY PERCUSSION VEST, DR. JONE LUNSFORD AT [facility]   • LUNG SURGERY Right 04/11/2017    RIGHT LUNG LAVAGE WITH REPIRATORY PERCUSSION VEST, DR. JONE LUNSFORD AT [facility]   • LUNG SURGERY Left 03/21/2017    LEFT LUNG LAVAGE WITH REPIRATORY PERCUSSION VEST, DR. JONE LUNSFORD AT [facility]   • LUNG SURGERY Right 02/08/2019    RIGHT LUNG LAVAGE WITH REPIRATORY PERCUSSION VEST, DR. JONE LUNSFORD AT [facility]   • LUNG SURGERY Left 01/15/2019    LEFT LUNG LAVAGE WITH REPIRATORY PERCUSSION VEST, DR. JONE LUNSFORD AT [facility]   • WRIST FRACTURE SURGERY Left        Social History:   reports that he has been smoking.  He has never used smokeless tobacco. He reports that he does not drink alcohol or use drugs.      Marriage status:     Family History   Problem Relation Age of Onset   • Lymphoma Father    • Cancer Father          Current Outpatient Medications:   •  sargramostim (LEUKINE) 250 MCG reconstituted solution, Inhaled the contents of 1 vial (250 mcg) daily., Disp: , Rfl:   •  traZODone (DESYREL) 50 MG tablet, Take 150 mg by mouth Every Night., Disp: , Rfl:   •  vilazodone (VIIBRYD) 40 MG tablet tablet, Take 40 mg by mouth Daily., Disp: , Rfl:   •  VYVANSE 50 MG capsule, Take 50 mg by mouth Every Morning, Disp: , Rfl: 0    Allergy  Patient has no known allergies.    Review of Systems   Constitution: Negative for decreased appetite, weakness and weight gain.   HENT: Negative for congestion, hearing loss and hoarse voice.    Eyes: Negative for blurred vision, discharge and visual disturbance.   Cardiovascular: Negative for chest pain, cyanosis and leg swelling.   Respiratory: Negative for cough, shortness of breath, sleep disturbances due to breathing and snoring.    Endocrine: Negative for cold intolerance and heat intolerance.   Hematologic/Lymphatic: Does not  bruise/bleed easily.   Skin: Negative for itching, poor wound healing and skin cancer.   Musculoskeletal: Negative for arthritis, back pain, joint pain and joint swelling.   Gastrointestinal: Negative for abdominal pain, change in bowel habit, bowel incontinence and constipation.   Genitourinary: Negative for bladder incontinence, dysuria and hematuria.   Neurological: Negative for brief paralysis, excessive daytime sleepiness, dizziness, focal weakness, headaches and light-headedness.   Psychiatric/Behavioral: Negative for altered mental status and hallucinations. The patient does not have insomnia.    Allergic/Immunologic: Negative for HIV exposure and persistent infections.   All other systems reviewed and are negative.      Vitals:    11/19/19 0907   BP: 110/68   Pulse: 79   Temp: 97.8 °F (36.6 °C)   SpO2: 99%     Body mass index is 25.37 kg/m².    Physical Exam   Constitutional: He is oriented to person, place, and time. He appears well-developed and well-nourished. No distress.   HENT:   Head: Normocephalic and atraumatic.   Nose: Nose normal.   Mouth/Throat: Oropharynx is clear and moist.   Eyes: Conjunctivae and EOM are normal. Pupils are equal, round, and reactive to light.   Neck: Normal range of motion. No tracheal deviation present.   Pulmonary/Chest: Effort normal and breath sounds normal. No respiratory distress.   Abdominal: Soft. He exhibits no distension.   Genitourinary:   Genitourinary Comments: Perianal exam: external hem - wnl.  +posterior fissure   Musculoskeletal: Normal range of motion. He exhibits no edema or deformity.   Neurological: He is alert and oriented to person, place, and time. No cranial nerve deficit. Coordination and gait normal.   Skin: Skin is warm and dry.   Psychiatric: He has a normal mood and affect. His behavior is normal. Judgment normal.       Review of Medical Record:  Requesting PCP Dr. Gonzales records    Assessment:  1. Anal fissure        Plan:    I discussed with  patient options on treating fissure: lateral internal anal sphincterotomy, chemical sphincterotomy with Botox, or topical creams of nitroglycerin, diltiazem, or nifedipine.  I discussed risks and benefits of all.  Risks of the lateral internal anal sphincterotomy are small chance of fecal incontinence, slow wound healing, and it is an invasive procedure versus the other 2 options, but the benefit is 97% success in fixing a fissure.  Chemical sphincterotomy has the benefit of no permanent fecal incontinence but a 80-85% success rate.  The topical creams have no incontinence risk, but are slow to heal the fissure and are only 80% successful.    I wrote for diltiazem and lidocaine cream to be placed on the anus 3 times a day.  I recommended taking MiraLAX and fiber daily.  I gave out written instructions.   Patient to follow-up in 5 weeks.    Scribed for Yani Leroy MD by Nita Ford PA-C  11/19/2019   This patient was evaluated by me, recommendations made, documentation reviewed, edited, and revised by me, Yani Leroy MD

## 2020-01-15 ENCOUNTER — TELEPHONE (OUTPATIENT)
Dept: SURGERY | Facility: CLINIC | Age: 45
End: 2020-01-15

## 2020-01-15 ENCOUNTER — OFFICE VISIT (OUTPATIENT)
Dept: SURGERY | Facility: CLINIC | Age: 45
End: 2020-01-15

## 2020-01-15 VITALS
DIASTOLIC BLOOD PRESSURE: 96 MMHG | HEIGHT: 71 IN | SYSTOLIC BLOOD PRESSURE: 146 MMHG | BODY MASS INDEX: 27.37 KG/M2 | OXYGEN SATURATION: 95 % | HEART RATE: 80 BPM | WEIGHT: 195.5 LBS | TEMPERATURE: 97.8 F

## 2020-01-15 DIAGNOSIS — K60.2 ANAL FISSURE: Primary | ICD-10-CM

## 2020-01-15 PROCEDURE — 99212 OFFICE O/P EST SF 10 MIN: CPT | Performed by: COLON & RECTAL SURGERY

## 2020-01-15 NOTE — TELEPHONE ENCOUNTER
Done.    ----- Message from Nita Ford PA-C sent at 1/15/2020  2:44 PM EST -----  Regarding: cy recall  Alana Catherine,    Can we please place recall for screening colonoscopy age 50?    Thanks!

## 2020-01-15 NOTE — PROGRESS NOTES
"Arturo Canales is a 44 y.o. male in for follow up of Anal fissure    Pt states his bottom is doing much better  He is no longer having any pain or bleeding  Ibuprofen was helpful  He has discontinued dilt/lido cream and stool softeners    /96 (BP Location: Left arm, Patient Position: Sitting, Cuff Size: Adult)   Pulse 80   Temp 97.8 °F (36.6 °C) (Oral)   Ht 180.3 cm (71\")   Wt 88.7 kg (195 lb 8 oz)   SpO2 95%   BMI 27.27 kg/m²   Body mass index is 27.27 kg/m².      PE:  Physical Exam   Constitutional: He appears well-developed. No distress.   HENT:   Head: Normocephalic and atraumatic.   Abdominal: Soft. He exhibits no distension.   Musculoskeletal: Normal range of motion.   Neurological: He is alert.   Psychiatric: Thought content normal.       Assessment:   1. Anal fissure         Plan:    His fissure symptoms have resolved with conservative medical management.  I recommend he take a daily fiber supplement and prn stool softeners.    Call or come in if symptoms recur, or if any questions/concerns.  He will be due to begin screening colonoscopies when he turns 45, however, will not be covered by his current insurance.  Per pt request, will give his CPT codes to discuss with his insurance.  Otherwise, will place recall for screening colonoscopy age 50.    RTC prn      Scribed for Yani Leroy MD by Nita Ford PA-C  1/15/2020   This patient was evaluated by me, recommendations made, documentation reviewed, edited, and revised by me, Yani Leroy MD        "

## 2020-05-05 ENCOUNTER — HOSPITAL ENCOUNTER (EMERGENCY)
Facility: HOSPITAL | Age: 45
Discharge: ANOTHER HOSPITAL | End: 2020-05-05
Attending: EMERGENCY MEDICINE | Admitting: EMERGENCY MEDICINE

## 2020-05-05 VITALS
DIASTOLIC BLOOD PRESSURE: 74 MMHG | WEIGHT: 180 LBS | SYSTOLIC BLOOD PRESSURE: 130 MMHG | TEMPERATURE: 97.9 F | HEIGHT: 71 IN | OXYGEN SATURATION: 99 % | HEART RATE: 54 BPM | RESPIRATION RATE: 16 BRPM | BODY MASS INDEX: 25.2 KG/M2

## 2020-05-05 DIAGNOSIS — S66.321A LACERATION OF EXTENSOR MUSCLE, FASCIA AND TENDON OF LEFT INDEX FINGER AT WRIST AND HAND LEVEL, INITIAL ENCOUNTER: Primary | ICD-10-CM

## 2020-05-05 PROCEDURE — 99283 EMERGENCY DEPT VISIT LOW MDM: CPT

## 2020-05-05 PROCEDURE — 99282 EMERGENCY DEPT VISIT SF MDM: CPT | Performed by: EMERGENCY MEDICINE

## 2020-05-05 NOTE — ED NOTES
Applied sterile 2x2 gauze on top of the wound and wrapped the finger with koband.      Donna Kern  05/05/20 5716

## 2020-05-05 NOTE — ED NOTES
Dr. Aguilar spoke with [facility] and [facility] on call physician. Call complete.      Donna Kern  05/05/20 2163

## 2020-05-05 NOTE — ED PROVIDER NOTES
Subjective     Finger Laceration   Location:  Left forefinger  Quality:  Lac  Severity:  Severe  Onset quality:  Sudden  Duration:  1 day  Timing:  Constant  Progression:  Unchanged  Chronicity:  New  Context:  Table saw accident  Relieved by:  Noth  Worsened by:  Mvmt  Ineffective treatments:  None      Review of Systems   Neurological: Positive for weakness. Negative for numbness.   All other systems reviewed and are negative.      Past Medical History:   Diagnosis Date   • ADD (attention deficit disorder)    • Anal fissure 11/19/2019    FOLLOWED BY DR. PHILLIP RYAN   • ARDS (adult respiratory distress syndrome) (CMS/HCC)    • Autoimmune pulmonary alveolar proteinosis (CMS/HCC)     FOLLOWED BY DR. FLORIN SMITH   • BRBPR (bright red blood per rectum) 09/2016   • Cervical strain 04/21/2004    SEEN AT [facility] ER   • Depression 07/12/2012    WITH ALCOHOL INTOXICATION, SEEN AT [facility] ER   • Disease of thyroid gland     HYPOTHYROIDISM   • Dyspnea    • Fatigue    • Hemorrhoids    • Interstitial lung disease (CMS/HCC)    • Metacarpal bone fracture 07/30/2012    4TH METACARPAL, RIGHT HAND   • Pneumonia 04/12/2017    ADMITTED TO [place]   • Pneumonia 10/28/2016    RIGHT LOWER LOBE, D/T HAEMOPHILUS INFLUENZA, ADMITTED TO [facility]   • Pulmonary fibrosis (CMS/HCC)    • Respiratory failure (CMS/HCC)    • Sepsis (CMS/HCC)    • Syncope and collapse 06/2018   • Wrist fracture        No Known Allergies    Past Surgical History:   Procedure Laterality Date   • BRONCHOSCOPY N/A 10/27/2016    Procedure: BRONCHOSCOPY WITH FLUORO & CRYO BIOPSY;  Surgeon: Italo Escalera MD;  Location: [facility] ENDOSCOPY;  Service:    • BRONCHOSCOPY N/A 10/31/2016    Procedure: BRONCHOSCOPY BIOPSY AT BEDSIDE;  Surgeon: Florin Smith MD;  Location: [facility] ENDOSCOPY;  Service:    • BRONCHOSCOPY N/A 11/4/2016    Procedure: BRONCHOSCOPY  WITH WASHINGS;  Surgeon: Florin Smith MD;  Location: [facility] ENDOSCOPY;  Service:    • KNEE ARTHROSCOPY Bilateral    • LUMBAR  DISCECTOMY N/A 10/2006    L5,S1, DR. KARISHMA CHRISTIAN AT [facility]   • LUMBAR DISCECTOMY Right 02/04/2008    REVISION OF L5-S1 DISKECTOMY, DR. HUMBERTO KEN AT [facility]   • LUNG SURGERY Right 05/18/2018    RIGHT LUNG LAVAGE WITH REPIRATORY PERCUSSION VEST, DR. JONE LUNSFORD AT [facility]   • LUNG SURGERY Left 05/15/2018    LEFT LUNG LAVAGE WITH REPIRATORY PERCUSSION VEST, DR. JONE LUNSFORD AT [facility]   • LUNG SURGERY Right 04/11/2017    RIGHT LUNG LAVAGE WITH REPIRATORY PERCUSSION VEST, DR. JONE LUNSFORD AT [facility]   • LUNG SURGERY Left 03/21/2017    LEFT LUNG LAVAGE WITH REPIRATORY PERCUSSION VEST, DR. JONE LUNSFORD AT [facility]   • LUNG SURGERY Right 02/08/2019    RIGHT LUNG LAVAGE WITH REPIRATORY PERCUSSION VEST, DR. JONE LUNSFORD AT [facility]   • LUNG SURGERY Left 01/15/2019    LEFT LUNG LAVAGE WITH REPIRATORY PERCUSSION VEST, DR. JONE LUNSFORD AT [facility]   • NERVE BLOCK Right 01/24/2008    RIGHT, S1 L5, SELECTIVE NERVE ROOT BLOCK, DR. JENS TEMPLE AT [facility]   • NERVE BLOCK Right 01/16/2008    RIGHT S1 SELECTIVE NERVE ROOT BLOCK, DR. JENS TEMPLE AT [facility]   • WRIST FRACTURE SURGERY Left        Family History   Problem Relation Age of Onset   • Lymphoma Father    • Cancer Father        Social History     Socioeconomic History   • Marital status:      Spouse name: Not on file   • Number of children: Not on file   • Years of education: Not on file   • Highest education level: Not on file   Tobacco Use   • Smoking status: Current Some Day Smoker   • Smokeless tobacco: Never Used   Substance and Sexual Activity   • Alcohol use: No   • Drug use: No   • Sexual activity: Defer           Objective   Physical Exam   Constitutional: He appears well-developed and well-nourished. No distress.   Musculoskeletal:   Left forefinger lac over dip with extensor tendon dysfunction  N/v intact  Mild bleeding controlled with pressure   Neurological: No sensory deficit. He exhibits normal muscle tone.   Skin: Skin is warm. Capillary  refill takes less than 2 seconds. He is not diaphoretic. No pallor.   Nursing note and vitals reviewed.      Procedures           ED Course           [facility] on call provider [facility]io [facility]  Pt agreed                                MDM  Number of Diagnoses or Management Options  Laceration of extensor muscle, fascia and tendon of left index finger at wrist and hand level, initial encounter:      Amount and/or Complexity of Data Reviewed  Discuss the patient with other providers: yes    Risk of Complications, Morbidity, and/or Mortality  Presenting problems: low  Diagnostic procedures: low  Management options: low    Patient Progress  Patient progress: stable      Final diagnoses:   Laceration of extensor muscle, fascia and tendon of left index finger at wrist and hand level, initial encounter            Toby Aguilar MD  05/05/20 [id number]

## 2020-05-05 NOTE — ED NOTES
Call placed to [facility] because the office of [employer] did not answer the 24-hour office call line. The  told me that they have put the information into the system and the on-call  for [facility] will return the call.      Donna Kern  05/05/20 [id number]